# Patient Record
Sex: FEMALE | Race: WHITE | Employment: FULL TIME
[De-identification: names, ages, dates, MRNs, and addresses within clinical notes are randomized per-mention and may not be internally consistent; named-entity substitution may affect disease eponyms.]

---

## 2017-01-01 ENCOUNTER — HOME CARE VISIT (OUTPATIENT)
Dept: SCHEDULING | Facility: HOME HEALTH | Age: 42
End: 2017-01-01
Payer: COMMERCIAL

## 2017-01-01 ENCOUNTER — TELEPHONE (OUTPATIENT)
Dept: GYNECOLOGY | Age: 42
End: 2017-01-01

## 2017-01-01 ENCOUNTER — HOSPITAL ENCOUNTER (INPATIENT)
Age: 42
LOS: 1 days | End: 2017-04-28
Attending: INTERNAL MEDICINE | Admitting: INTERNAL MEDICINE

## 2017-01-01 ENCOUNTER — DOCUMENTATION ONLY (OUTPATIENT)
Dept: GYNECOLOGY | Age: 42
End: 2017-01-01

## 2017-01-01 ENCOUNTER — HOME CARE VISIT (OUTPATIENT)
Dept: HOSPICE | Facility: HOSPICE | Age: 42
End: 2017-01-01
Payer: COMMERCIAL

## 2017-01-01 ENCOUNTER — HOSPITAL ENCOUNTER (OUTPATIENT)
Dept: INFUSION THERAPY | Age: 42
End: 2017-01-01

## 2017-01-01 ENCOUNTER — HOSPITAL ENCOUNTER (OUTPATIENT)
Dept: GENERAL RADIOLOGY | Age: 42
Discharge: HOME | End: 2017-02-11
Payer: COMMERCIAL

## 2017-01-01 ENCOUNTER — HOSPITAL ENCOUNTER (OUTPATIENT)
Dept: INFUSION THERAPY | Age: 42
End: 2017-01-01
Payer: COMMERCIAL

## 2017-01-01 ENCOUNTER — OFFICE VISIT (OUTPATIENT)
Dept: GYNECOLOGY | Age: 42
End: 2017-01-01

## 2017-01-01 ENCOUNTER — HOSPITAL ENCOUNTER (OUTPATIENT)
Dept: INFUSION THERAPY | Age: 42
Discharge: HOME | End: 2017-02-09
Payer: COMMERCIAL

## 2017-01-01 ENCOUNTER — APPOINTMENT (OUTPATIENT)
Dept: ULTRASOUND IMAGING | Age: 42
DRG: 683 | End: 2017-01-01
Attending: OBSTETRICS & GYNECOLOGY
Payer: COMMERCIAL

## 2017-01-01 ENCOUNTER — HOSPITAL ENCOUNTER (OUTPATIENT)
Dept: INFUSION THERAPY | Age: 42
Discharge: HOME | End: 2017-01-26
Payer: COMMERCIAL

## 2017-01-01 ENCOUNTER — ANESTHESIA (OUTPATIENT)
Dept: SURGERY | Age: 42
End: 2017-01-01
Payer: COMMERCIAL

## 2017-01-01 ENCOUNTER — HOSPICE ADMISSION (OUTPATIENT)
Dept: HOSPICE | Facility: HOSPICE | Age: 42
End: 2017-01-01
Payer: COMMERCIAL

## 2017-01-01 ENCOUNTER — DOCUMENTATION ONLY (OUTPATIENT)
Dept: ONCOLOGY | Age: 42
End: 2017-01-01

## 2017-01-01 ENCOUNTER — HOSPITAL ENCOUNTER (INPATIENT)
Age: 42
LOS: 4 days | Discharge: HOME HEALTH | DRG: 683 | End: 2017-03-01
Attending: OBSTETRICS & GYNECOLOGY | Admitting: OBSTETRICS & GYNECOLOGY
Payer: COMMERCIAL

## 2017-01-01 ENCOUNTER — APPOINTMENT (OUTPATIENT)
Dept: INTERVENTIONAL RADIOLOGY/VASCULAR | Age: 42
DRG: 683 | End: 2017-01-01
Attending: UROLOGY
Payer: COMMERCIAL

## 2017-01-01 ENCOUNTER — TELEPHONE (OUTPATIENT)
Dept: ONCOLOGY | Age: 42
End: 2017-01-01

## 2017-01-01 ENCOUNTER — HOSPITAL ENCOUNTER (OUTPATIENT)
Dept: ULTRASOUND IMAGING | Age: 42
Discharge: HOME | End: 2017-01-09
Attending: OBSTETRICS & GYNECOLOGY
Payer: COMMERCIAL

## 2017-01-01 ENCOUNTER — HOSPITAL ENCOUNTER (OUTPATIENT)
Dept: INFUSION THERAPY | Age: 42
Discharge: HOME | End: 2017-02-02
Payer: COMMERCIAL

## 2017-01-01 ENCOUNTER — HOSPITAL ENCOUNTER (OUTPATIENT)
Dept: INFUSION THERAPY | Age: 42
Discharge: HOME | End: 2017-02-24
Payer: COMMERCIAL

## 2017-01-01 ENCOUNTER — APPOINTMENT (OUTPATIENT)
Dept: INTERVENTIONAL RADIOLOGY/VASCULAR | Age: 42
DRG: 683 | End: 2017-01-01
Attending: INTERNAL MEDICINE
Payer: COMMERCIAL

## 2017-01-01 ENCOUNTER — APPOINTMENT (OUTPATIENT)
Dept: INFUSION THERAPY | Age: 42
End: 2017-01-01
Payer: COMMERCIAL

## 2017-01-01 ENCOUNTER — HOSPITAL ENCOUNTER (OUTPATIENT)
Dept: INFUSION THERAPY | Age: 42
Discharge: HOME | End: 2017-01-25
Payer: COMMERCIAL

## 2017-01-01 ENCOUNTER — NURSE NAVIGATOR (OUTPATIENT)
Dept: ONCOLOGY | Age: 42
End: 2017-01-01

## 2017-01-01 ENCOUNTER — HOSPICE CERTIFICATION ENCOUNTER (OUTPATIENT)
Dept: GERIATRIC MEDICINE | Age: 42
End: 2017-01-01

## 2017-01-01 ENCOUNTER — SURGERY (OUTPATIENT)
Age: 42
End: 2017-01-01

## 2017-01-01 ENCOUNTER — HOSPITAL ENCOUNTER (OUTPATIENT)
Dept: INFUSION THERAPY | Age: 42
Discharge: HOME | End: 2017-01-19
Payer: COMMERCIAL

## 2017-01-01 ENCOUNTER — HOSPITAL ENCOUNTER (OUTPATIENT)
Dept: INFUSION THERAPY | Age: 42
Discharge: HOME | End: 2017-02-08
Payer: COMMERCIAL

## 2017-01-01 ENCOUNTER — APPOINTMENT (OUTPATIENT)
Dept: INTERVENTIONAL RADIOLOGY/VASCULAR | Age: 42
End: 2017-01-01
Attending: EMERGENCY MEDICINE
Payer: COMMERCIAL

## 2017-01-01 ENCOUNTER — HOSPITAL ENCOUNTER (OUTPATIENT)
Dept: INFUSION THERAPY | Age: 42
Discharge: HOME | End: 2017-01-05
Payer: COMMERCIAL

## 2017-01-01 ENCOUNTER — HOSPITAL ENCOUNTER (OUTPATIENT)
Dept: INFUSION THERAPY | Age: 42
Discharge: HOME | End: 2017-02-07
Payer: COMMERCIAL

## 2017-01-01 ENCOUNTER — HOSPITAL ENCOUNTER (EMERGENCY)
Age: 42
Discharge: HOME | End: 2017-03-10
Attending: EMERGENCY MEDICINE
Payer: COMMERCIAL

## 2017-01-01 ENCOUNTER — HOSPITAL ENCOUNTER (OUTPATIENT)
Dept: INFUSION THERAPY | Age: 42
Discharge: HOME | End: 2017-01-12
Payer: COMMERCIAL

## 2017-01-01 ENCOUNTER — HOSPITAL ENCOUNTER (OUTPATIENT)
Age: 42
Setting detail: OUTPATIENT SURGERY
Discharge: HOME | End: 2017-01-16
Attending: OBSTETRICS & GYNECOLOGY | Admitting: OBSTETRICS & GYNECOLOGY
Payer: COMMERCIAL

## 2017-01-01 ENCOUNTER — HOSPITAL ENCOUNTER (OUTPATIENT)
Dept: ULTRASOUND IMAGING | Age: 42
Discharge: HOME | DRG: 683 | End: 2017-02-27
Attending: NURSE PRACTITIONER
Payer: COMMERCIAL

## 2017-01-01 ENCOUNTER — APPOINTMENT (OUTPATIENT)
Dept: GENERAL RADIOLOGY | Age: 42
DRG: 683 | End: 2017-01-01
Attending: RADIOLOGY
Payer: COMMERCIAL

## 2017-01-01 ENCOUNTER — ANESTHESIA EVENT (OUTPATIENT)
Dept: SURGERY | Age: 42
End: 2017-01-01
Payer: COMMERCIAL

## 2017-01-01 ENCOUNTER — APPOINTMENT (OUTPATIENT)
Dept: CT IMAGING | Age: 42
DRG: 683 | End: 2017-01-01
Attending: INTERNAL MEDICINE
Payer: COMMERCIAL

## 2017-01-01 ENCOUNTER — APPOINTMENT (OUTPATIENT)
Dept: GENERAL RADIOLOGY | Age: 42
DRG: 683 | End: 2017-01-01
Attending: OBSTETRICS & GYNECOLOGY
Payer: COMMERCIAL

## 2017-01-01 VITALS
SYSTOLIC BLOOD PRESSURE: 107 MMHG | OXYGEN SATURATION: 99 % | DIASTOLIC BLOOD PRESSURE: 74 MMHG | RESPIRATION RATE: 18 BRPM | HEART RATE: 100 BPM

## 2017-01-01 VITALS
SYSTOLIC BLOOD PRESSURE: 138 MMHG | DIASTOLIC BLOOD PRESSURE: 79 MMHG | TEMPERATURE: 98.1 F | RESPIRATION RATE: 18 BRPM | HEART RATE: 66 BPM

## 2017-01-01 VITALS — SYSTOLIC BLOOD PRESSURE: 112 MMHG | HEART RATE: 114 BPM | DIASTOLIC BLOOD PRESSURE: 81 MMHG

## 2017-01-01 VITALS
OXYGEN SATURATION: 98 % | DIASTOLIC BLOOD PRESSURE: 70 MMHG | SYSTOLIC BLOOD PRESSURE: 130 MMHG | RESPIRATION RATE: 18 BRPM | HEART RATE: 98 BPM

## 2017-01-01 VITALS
RESPIRATION RATE: 16 BRPM | TEMPERATURE: 98 F | OXYGEN SATURATION: 100 % | WEIGHT: 259 LBS | DIASTOLIC BLOOD PRESSURE: 78 MMHG | HEIGHT: 66 IN | BODY MASS INDEX: 41.62 KG/M2 | HEART RATE: 70 BPM | SYSTOLIC BLOOD PRESSURE: 120 MMHG

## 2017-01-01 VITALS
TEMPERATURE: 101.4 F | HEART RATE: 79 BPM | SYSTOLIC BLOOD PRESSURE: 133 MMHG | RESPIRATION RATE: 16 BRPM | DIASTOLIC BLOOD PRESSURE: 82 MMHG

## 2017-01-01 VITALS
RESPIRATION RATE: 42 BRPM | OXYGEN SATURATION: 96 % | SYSTOLIC BLOOD PRESSURE: 102 MMHG | RESPIRATION RATE: 16 BRPM | TEMPERATURE: 97.4 F | DIASTOLIC BLOOD PRESSURE: 60 MMHG | HEART RATE: 120 BPM | HEART RATE: 50 BPM

## 2017-01-01 VITALS
HEART RATE: 70 BPM | SYSTOLIC BLOOD PRESSURE: 139 MMHG | WEIGHT: 264 LBS | HEIGHT: 66 IN | BODY MASS INDEX: 42.43 KG/M2 | DIASTOLIC BLOOD PRESSURE: 78 MMHG

## 2017-01-01 VITALS — WEIGHT: 288 LBS | BODY MASS INDEX: 46.28 KG/M2 | HEIGHT: 66 IN

## 2017-01-01 VITALS
HEART RATE: 100 BPM | SYSTOLIC BLOOD PRESSURE: 130 MMHG | OXYGEN SATURATION: 99 % | RESPIRATION RATE: 16 BRPM | DIASTOLIC BLOOD PRESSURE: 70 MMHG

## 2017-01-01 VITALS
RESPIRATION RATE: 18 BRPM | HEART RATE: 79 BPM | TEMPERATURE: 100.8 F | OXYGEN SATURATION: 97 % | SYSTOLIC BLOOD PRESSURE: 147 MMHG | DIASTOLIC BLOOD PRESSURE: 82 MMHG

## 2017-01-01 VITALS
WEIGHT: 293 LBS | HEIGHT: 67 IN | OXYGEN SATURATION: 97 % | HEART RATE: 80 BPM | SYSTOLIC BLOOD PRESSURE: 138 MMHG | BODY MASS INDEX: 45.99 KG/M2 | DIASTOLIC BLOOD PRESSURE: 78 MMHG | TEMPERATURE: 100.1 F | RESPIRATION RATE: 16 BRPM

## 2017-01-01 VITALS
TEMPERATURE: 96.3 F | HEART RATE: 66 BPM | DIASTOLIC BLOOD PRESSURE: 90 MMHG | SYSTOLIC BLOOD PRESSURE: 143 MMHG | RESPIRATION RATE: 18 BRPM

## 2017-01-01 VITALS — RESPIRATION RATE: 16 BRPM

## 2017-01-01 VITALS
OXYGEN SATURATION: 94 % | RESPIRATION RATE: 17 BRPM | SYSTOLIC BLOOD PRESSURE: 153 MMHG | HEART RATE: 75 BPM | WEIGHT: 266.54 LBS | BODY MASS INDEX: 41.83 KG/M2 | TEMPERATURE: 98.6 F | HEIGHT: 67 IN | DIASTOLIC BLOOD PRESSURE: 91 MMHG

## 2017-01-01 VITALS
HEIGHT: 67 IN | BODY MASS INDEX: 45.99 KG/M2 | RESPIRATION RATE: 18 BRPM | HEART RATE: 77 BPM | SYSTOLIC BLOOD PRESSURE: 133 MMHG | DIASTOLIC BLOOD PRESSURE: 83 MMHG | OXYGEN SATURATION: 95 % | WEIGHT: 293 LBS | TEMPERATURE: 98.3 F

## 2017-01-01 VITALS
RESPIRATION RATE: 16 BRPM | OXYGEN SATURATION: 99 % | SYSTOLIC BLOOD PRESSURE: 132 MMHG | DIASTOLIC BLOOD PRESSURE: 80 MMHG | HEART RATE: 89 BPM

## 2017-01-01 VITALS — HEART RATE: 78 BPM | DIASTOLIC BLOOD PRESSURE: 86 MMHG | SYSTOLIC BLOOD PRESSURE: 138 MMHG | RESPIRATION RATE: 18 BRPM

## 2017-01-01 VITALS — HEART RATE: 88 BPM | RESPIRATION RATE: 18 BRPM

## 2017-01-01 VITALS
OXYGEN SATURATION: 95 % | WEIGHT: 293 LBS | TEMPERATURE: 100.1 F | BODY MASS INDEX: 45.99 KG/M2 | HEIGHT: 67 IN | DIASTOLIC BLOOD PRESSURE: 77 MMHG | SYSTOLIC BLOOD PRESSURE: 132 MMHG | RESPIRATION RATE: 18 BRPM | HEART RATE: 91 BPM

## 2017-01-01 VITALS
DIASTOLIC BLOOD PRESSURE: 62 MMHG | SYSTOLIC BLOOD PRESSURE: 98 MMHG | HEART RATE: 96 BPM | WEIGHT: 220 LBS | RESPIRATION RATE: 26 BRPM | HEIGHT: 66 IN | BODY MASS INDEX: 35.36 KG/M2

## 2017-01-01 VITALS
TEMPERATURE: 97.2 F | OXYGEN SATURATION: 97 % | BODY MASS INDEX: 45.99 KG/M2 | RESPIRATION RATE: 18 BRPM | HEIGHT: 67 IN | SYSTOLIC BLOOD PRESSURE: 163 MMHG | WEIGHT: 293 LBS | DIASTOLIC BLOOD PRESSURE: 80 MMHG | HEART RATE: 56 BPM

## 2017-01-01 VITALS
TEMPERATURE: 98.4 F | RESPIRATION RATE: 16 BRPM | SYSTOLIC BLOOD PRESSURE: 161 MMHG | DIASTOLIC BLOOD PRESSURE: 80 MMHG | HEART RATE: 68 BPM

## 2017-01-01 VITALS
RESPIRATION RATE: 18 BRPM | HEART RATE: 111 BPM | SYSTOLIC BLOOD PRESSURE: 100 MMHG | DIASTOLIC BLOOD PRESSURE: 60 MMHG | OXYGEN SATURATION: 99 %

## 2017-01-01 VITALS
DIASTOLIC BLOOD PRESSURE: 76 MMHG | SYSTOLIC BLOOD PRESSURE: 140 MMHG | RESPIRATION RATE: 16 BRPM | TEMPERATURE: 99.6 F | HEART RATE: 80 BPM

## 2017-01-01 DIAGNOSIS — N17.9 ACUTE RENAL FAILURE, UNSPECIFIED ACUTE RENAL FAILURE TYPE (HCC): Primary | ICD-10-CM

## 2017-01-01 DIAGNOSIS — R63.4 WEIGHT LOSS, ABNORMAL: ICD-10-CM

## 2017-01-01 DIAGNOSIS — N17.9 ACUTE RENAL FAILURE, UNSPECIFIED ACUTE RENAL FAILURE TYPE (HCC): ICD-10-CM

## 2017-01-01 DIAGNOSIS — E66.01 OBESITY, MORBID, BMI 50 OR HIGHER (HCC): ICD-10-CM

## 2017-01-01 DIAGNOSIS — G89.3 CANCER ASSOCIATED PAIN: ICD-10-CM

## 2017-01-01 DIAGNOSIS — T30.0 RADIATION BURN: ICD-10-CM

## 2017-01-01 DIAGNOSIS — R30.0 DYSURIA: ICD-10-CM

## 2017-01-01 DIAGNOSIS — M54.5 ACUTE LOW BACK PAIN, UNSPECIFIED BACK PAIN LATERALITY, WITH SCIATICA PRESENCE UNSPECIFIED: Primary | ICD-10-CM

## 2017-01-01 DIAGNOSIS — C53.0 MALIGNANT NEOPLASM OF ENDOCERVIX (HCC): Primary | ICD-10-CM

## 2017-01-01 DIAGNOSIS — C53.0 MALIGNANT NEOPLASM OF ENDOCERVIX (HCC): ICD-10-CM

## 2017-01-01 DIAGNOSIS — R63.0 ANOREXIA: Primary | ICD-10-CM

## 2017-01-01 DIAGNOSIS — M25.551 HIP PAIN, ACUTE, RIGHT: ICD-10-CM

## 2017-01-01 DIAGNOSIS — N13.2 HYDRONEPHROSIS WITH RENAL AND URETERAL CALCULUS OBSTRUCTION: ICD-10-CM

## 2017-01-01 DIAGNOSIS — M54.41 CHRONIC RIGHT-SIDED LOW BACK PAIN WITH RIGHT-SIDED SCIATICA: Primary | ICD-10-CM

## 2017-01-01 DIAGNOSIS — M54.41 ACUTE RIGHT-SIDED LOW BACK PAIN WITH RIGHT-SIDED SCIATICA: ICD-10-CM

## 2017-01-01 DIAGNOSIS — N39.0 URINARY TRACT INFECTION WITHOUT HEMATURIA, SITE UNSPECIFIED: Primary | ICD-10-CM

## 2017-01-01 DIAGNOSIS — D64.9 ANEMIA, UNSPECIFIED TYPE: ICD-10-CM

## 2017-01-01 DIAGNOSIS — D64.9 ANEMIA, UNSPECIFIED TYPE: Primary | ICD-10-CM

## 2017-01-01 DIAGNOSIS — E83.42 HYPOMAGNESEMIA: Primary | ICD-10-CM

## 2017-01-01 DIAGNOSIS — N19 RENAL FAILURE: ICD-10-CM

## 2017-01-01 DIAGNOSIS — Z92.3 S/P RADIATION THERAPY < 4 WEEKS AGO: ICD-10-CM

## 2017-01-01 DIAGNOSIS — T83.022A NEPHROSTOMY TUBE DISPLACED (HCC): Primary | ICD-10-CM

## 2017-01-01 DIAGNOSIS — G89.29 CHRONIC RIGHT-SIDED LOW BACK PAIN WITH RIGHT-SIDED SCIATICA: Primary | ICD-10-CM

## 2017-01-01 DIAGNOSIS — M54.41 ACUTE RIGHT-SIDED LOW BACK PAIN WITH RIGHT-SIDED SCIATICA: Primary | ICD-10-CM

## 2017-01-01 DIAGNOSIS — T83.022A DISPLACEMENT OF NEPHROSTOMY TUBE (HCC): ICD-10-CM

## 2017-01-01 DIAGNOSIS — E86.0 DEHYDRATION: ICD-10-CM

## 2017-01-01 LAB
ABO + RH BLD: NORMAL
ALBUMIN SERPL BCP-MCNC: 2 G/DL (ref 3.5–5)
ALBUMIN SERPL BCP-MCNC: 2 G/DL (ref 3.5–5)
ALBUMIN SERPL BCP-MCNC: 2.3 G/DL (ref 3.5–5)
ALBUMIN SERPL BCP-MCNC: 2.4 G/DL (ref 3.5–5)
ALBUMIN SERPL BCP-MCNC: 2.5 G/DL (ref 3.5–5)
ALBUMIN SERPL BCP-MCNC: 2.6 G/DL (ref 3.5–5)
ALBUMIN SERPL BCP-MCNC: 2.7 G/DL (ref 3.5–5)
ALBUMIN SERPL BCP-MCNC: 2.9 G/DL (ref 3.5–5)
ALBUMIN SERPL-MCNC: 2.8 G/DL (ref 3.5–5.5)
ALBUMIN SERPL-MCNC: 3.2 G/DL (ref 3.5–5.5)
ALBUMIN SERPL-MCNC: 3.4 G/DL (ref 3.5–5.5)
ALBUMIN/GLOB SERPL: 0.4 {RATIO} (ref 1.1–2.2)
ALBUMIN/GLOB SERPL: 0.5 {RATIO} (ref 1.1–2.2)
ALBUMIN/GLOB SERPL: 0.6 {RATIO} (ref 1.1–2.2)
ALBUMIN/GLOB SERPL: 0.7 {RATIO} (ref 1.1–2.2)
ALBUMIN/GLOB SERPL: 0.8 {RATIO} (ref 1.1–2.2)
ALBUMIN/GLOB SERPL: 0.9 {RATIO} (ref 1.1–2.2)
ALBUMIN/GLOB SERPL: 1 {RATIO} (ref 1.2–2.2)
ALBUMIN/GLOB SERPL: 1.1 {RATIO} (ref 1.1–2.5)
ALBUMIN/GLOB SERPL: 1.1 {RATIO} (ref 1.1–2.5)
ALP SERPL-CCNC: 193 U/L (ref 45–117)
ALP SERPL-CCNC: 194 U/L (ref 45–117)
ALP SERPL-CCNC: 195 U/L (ref 45–117)
ALP SERPL-CCNC: 198 U/L (ref 45–117)
ALP SERPL-CCNC: 228 U/L (ref 45–117)
ALP SERPL-CCNC: 229 U/L (ref 45–117)
ALP SERPL-CCNC: 234 U/L (ref 45–117)
ALP SERPL-CCNC: 235 IU/L (ref 39–117)
ALP SERPL-CCNC: 253 U/L (ref 45–117)
ALP SERPL-CCNC: 273 IU/L (ref 39–117)
ALP SERPL-CCNC: 294 U/L (ref 45–117)
ALP SERPL-CCNC: 318 IU/L (ref 39–117)
ALT SERPL-CCNC: 10 U/L (ref 12–78)
ALT SERPL-CCNC: 10 U/L (ref 12–78)
ALT SERPL-CCNC: 11 U/L (ref 12–78)
ALT SERPL-CCNC: 11 U/L (ref 12–78)
ALT SERPL-CCNC: 12 U/L (ref 12–78)
ALT SERPL-CCNC: 13 U/L (ref 12–78)
ALT SERPL-CCNC: 14 U/L (ref 12–78)
ALT SERPL-CCNC: 15 U/L (ref 12–78)
ALT SERPL-CCNC: 21 U/L (ref 12–78)
ALT SERPL-CCNC: 22 IU/L (ref 0–32)
ALT SERPL-CCNC: 6 IU/L (ref 0–32)
ALT SERPL-CCNC: 8 IU/L (ref 0–32)
AMORPH CRY URNS QL MICRO: ABNORMAL
ANION GAP BLD CALC-SCNC: 10 MMOL/L (ref 5–15)
ANION GAP BLD CALC-SCNC: 13 MMOL/L (ref 5–15)
ANION GAP BLD CALC-SCNC: 13 MMOL/L (ref 5–15)
ANION GAP BLD CALC-SCNC: 15 MMOL/L (ref 5–15)
ANION GAP BLD CALC-SCNC: 15 MMOL/L (ref 5–15)
ANION GAP BLD CALC-SCNC: 16 MMOL/L (ref 5–15)
ANION GAP BLD CALC-SCNC: 6 MMOL/L (ref 5–15)
ANION GAP BLD CALC-SCNC: 7 MMOL/L (ref 5–15)
ANION GAP BLD CALC-SCNC: 8 MMOL/L (ref 5–15)
ANION GAP BLD CALC-SCNC: 8 MMOL/L (ref 5–15)
ANION GAP BLD CALC-SCNC: 9 MMOL/L (ref 5–15)
ANION GAP BLD CALC-SCNC: 9 MMOL/L (ref 5–15)
APPEARANCE UR: ABNORMAL
APPEARANCE UR: ABNORMAL
AST SERPL W P-5'-P-CCNC: 11 U/L (ref 15–37)
AST SERPL W P-5'-P-CCNC: 11 U/L (ref 15–37)
AST SERPL W P-5'-P-CCNC: 15 U/L (ref 15–37)
AST SERPL W P-5'-P-CCNC: 17 U/L (ref 15–37)
AST SERPL W P-5'-P-CCNC: 22 U/L (ref 15–37)
AST SERPL W P-5'-P-CCNC: 23 U/L (ref 15–37)
AST SERPL W P-5'-P-CCNC: 24 U/L (ref 15–37)
AST SERPL W P-5'-P-CCNC: 25 U/L (ref 15–37)
AST SERPL W P-5'-P-CCNC: 9 U/L (ref 15–37)
AST SERPL-CCNC: 10 IU/L (ref 0–40)
AST SERPL-CCNC: 16 IU/L (ref 0–40)
AST SERPL-CCNC: 19 IU/L (ref 0–40)
BACTERIA #/AREA URNS HPF: ABNORMAL /[HPF]
BACTERIA SPEC CULT: ABNORMAL
BACTERIA UR CULT: NORMAL
BACTERIA URNS QL MICRO: ABNORMAL /HPF
BASOPHILS # BLD AUTO: 0 K/UL (ref 0–0.1)
BASOPHILS # BLD AUTO: 0 X10E3/UL (ref 0–0.2)
BASOPHILS # BLD AUTO: 0 X10E3/UL (ref 0–0.2)
BASOPHILS # BLD: 0 % (ref 0–1)
BASOPHILS # BLD: 1 % (ref 0–1)
BASOPHILS NFR BLD AUTO: 0 %
BASOPHILS NFR BLD AUTO: 0 %
BILIRUB SERPL-MCNC: 0.3 MG/DL (ref 0.2–1)
BILIRUB SERPL-MCNC: 0.4 MG/DL (ref 0.2–1)
BILIRUB SERPL-MCNC: 0.5 MG/DL (ref 0.2–1)
BILIRUB SERPL-MCNC: 0.5 MG/DL (ref 0–1.2)
BILIRUB SERPL-MCNC: 0.6 MG/DL (ref 0.2–1)
BILIRUB SERPL-MCNC: 0.6 MG/DL (ref 0–1.2)
BILIRUB SERPL-MCNC: 0.6 MG/DL (ref 0–1.2)
BILIRUB SERPL-MCNC: 1 MG/DL (ref 0.2–1)
BILIRUB UR QL STRIP: NEGATIVE
BILIRUB UR QL: NEGATIVE
BLD PROD TYP BPU: NORMAL
BLOOD GROUP ANTIBODIES SERPL: NORMAL
BPU ID: NORMAL
BUN SERPL-MCNC: 103 MG/DL (ref 6–20)
BUN SERPL-MCNC: 103 MG/DL (ref 6–20)
BUN SERPL-MCNC: 104 MG/DL (ref 6–20)
BUN SERPL-MCNC: 113 MG/DL (ref 6–24)
BUN SERPL-MCNC: 13 MG/DL (ref 6–20)
BUN SERPL-MCNC: 14 MG/DL (ref 6–20)
BUN SERPL-MCNC: 23 MG/DL (ref 6–20)
BUN SERPL-MCNC: 28 MG/DL (ref 6–24)
BUN SERPL-MCNC: 31 MG/DL (ref 6–20)
BUN SERPL-MCNC: 35 MG/DL (ref 6–20)
BUN SERPL-MCNC: 36 MG/DL (ref 6–24)
BUN SERPL-MCNC: 42 MG/DL (ref 6–20)
BUN SERPL-MCNC: 50 MG/DL (ref 6–20)
BUN SERPL-MCNC: 51 MG/DL (ref 6–20)
BUN SERPL-MCNC: 59 MG/DL (ref 6–20)
BUN/CREAT SERPL: 10 (ref 12–20)
BUN/CREAT SERPL: 11 (ref 12–20)
BUN/CREAT SERPL: 12 (ref 12–20)
BUN/CREAT SERPL: 12 (ref 9–23)
BUN/CREAT SERPL: 13 (ref 12–20)
BUN/CREAT SERPL: 13 (ref 12–20)
BUN/CREAT SERPL: 14 (ref 12–20)
BUN/CREAT SERPL: 15 (ref 12–20)
BUN/CREAT SERPL: 15 (ref 12–20)
BUN/CREAT SERPL: 16 (ref 9–23)
BUN/CREAT SERPL: 17 (ref 9–23)
BUN/CREAT SERPL: 9 (ref 12–20)
BUN/CREAT SERPL: 9 (ref 12–20)
CALCIUM SERPL-MCNC: 7.3 MG/DL (ref 8.5–10.1)
CALCIUM SERPL-MCNC: 7.5 MG/DL (ref 8.5–10.1)
CALCIUM SERPL-MCNC: 7.8 MG/DL (ref 8.5–10.1)
CALCIUM SERPL-MCNC: 7.9 MG/DL (ref 8.5–10.1)
CALCIUM SERPL-MCNC: 8 MG/DL (ref 8.5–10.1)
CALCIUM SERPL-MCNC: 8.2 MG/DL (ref 8.5–10.1)
CALCIUM SERPL-MCNC: 8.4 MG/DL (ref 8.5–10.1)
CALCIUM SERPL-MCNC: 8.4 MG/DL (ref 8.5–10.1)
CALCIUM SERPL-MCNC: 8.4 MG/DL (ref 8.7–10.2)
CALCIUM SERPL-MCNC: 8.5 MG/DL (ref 8.5–10.1)
CALCIUM SERPL-MCNC: 8.5 MG/DL (ref 8.7–10.2)
CALCIUM SERPL-MCNC: 8.7 MG/DL (ref 8.5–10.1)
CALCIUM SERPL-MCNC: 8.7 MG/DL (ref 8.7–10.2)
CASTS URNS QL MICRO: ABNORMAL /LPF
CC UR VC: ABNORMAL
CHLORIDE SERPL-SCNC: 100 MMOL/L (ref 97–108)
CHLORIDE SERPL-SCNC: 101 MMOL/L (ref 97–108)
CHLORIDE SERPL-SCNC: 102 MMOL/L (ref 97–108)
CHLORIDE SERPL-SCNC: 103 MMOL/L (ref 97–108)
CHLORIDE SERPL-SCNC: 103 MMOL/L (ref 97–108)
CHLORIDE SERPL-SCNC: 105 MMOL/L (ref 97–108)
CHLORIDE SERPL-SCNC: 106 MMOL/L (ref 97–108)
CHLORIDE SERPL-SCNC: 110 MMOL/L (ref 97–108)
CHLORIDE SERPL-SCNC: 92 MMOL/L (ref 96–106)
CHLORIDE SERPL-SCNC: 92 MMOL/L (ref 97–108)
CHLORIDE SERPL-SCNC: 93 MMOL/L (ref 96–106)
CHLORIDE SERPL-SCNC: 94 MMOL/L (ref 96–106)
CHLORIDE SERPL-SCNC: 96 MMOL/L (ref 97–108)
CHLORIDE SERPL-SCNC: 97 MMOL/L (ref 97–108)
CHLORIDE SERPL-SCNC: 99 MMOL/L (ref 97–108)
CO2 SERPL-SCNC: 15 MMOL/L (ref 21–32)
CO2 SERPL-SCNC: 16 MMOL/L (ref 18–29)
CO2 SERPL-SCNC: 16 MMOL/L (ref 21–32)
CO2 SERPL-SCNC: 18 MMOL/L (ref 21–32)
CO2 SERPL-SCNC: 18 MMOL/L (ref 21–32)
CO2 SERPL-SCNC: 19 MMOL/L (ref 18–29)
CO2 SERPL-SCNC: 20 MMOL/L (ref 21–32)
CO2 SERPL-SCNC: 21 MMOL/L (ref 21–32)
CO2 SERPL-SCNC: 22 MMOL/L (ref 18–29)
CO2 SERPL-SCNC: 24 MMOL/L (ref 21–32)
CO2 SERPL-SCNC: 25 MMOL/L (ref 21–32)
CO2 SERPL-SCNC: 28 MMOL/L (ref 21–32)
CO2 SERPL-SCNC: 28 MMOL/L (ref 21–32)
CO2 SERPL-SCNC: 29 MMOL/L (ref 21–32)
CO2 SERPL-SCNC: 30 MMOL/L (ref 21–32)
COLLECT DURATION TIME UR: 24 HR
COLOR UR: ABNORMAL
COLOR UR: YELLOW
CREAT SERPL-MCNC: 1.19 MG/DL (ref 0.55–1.02)
CREAT SERPL-MCNC: 1.55 MG/DL (ref 0.55–1.02)
CREAT SERPL-MCNC: 1.6 MG/DL (ref 0.55–1.02)
CREAT SERPL-MCNC: 2.15 MG/DL (ref 0.57–1)
CREAT SERPL-MCNC: 2.33 MG/DL (ref 0.55–1.02)
CREAT SERPL-MCNC: 2.41 MG/DL (ref 0.57–1)
CREAT SERPL-MCNC: 3.36 MG/DL (ref 0.55–1.02)
CREAT SERPL-MCNC: 3.48 MG/DL (ref 0.55–1.02)
CREAT SERPL-MCNC: 4.26 MG/DL (ref 0.55–1.02)
CREAT SERPL-MCNC: 4.78 MG/DL (ref 0.55–1.02)
CREAT SERPL-MCNC: 4.86 MG/DL (ref 0.55–1.02)
CREAT SERPL-MCNC: 7.03 MG/DL (ref 0.55–1.02)
CREAT SERPL-MCNC: 7.25 MG/DL (ref 0.57–1)
CREAT SERPL-MCNC: 8.12 MG/DL (ref 0.55–1.02)
CREAT SERPL-MCNC: 8.59 MG/DL (ref 0.55–1.02)
CREAT UR-MCNC: 78.6 MG/DL
CROSSMATCH RESULT,%XM: NORMAL
DIFFERENTIAL METHOD BLD: ABNORMAL
EOSINOPHIL # BLD AUTO: 0.1 X10E3/UL (ref 0–0.4)
EOSINOPHIL # BLD AUTO: 0.1 X10E3/UL (ref 0–0.4)
EOSINOPHIL # BLD: 0 K/UL (ref 0–0.4)
EOSINOPHIL # BLD: 0 K/UL (ref 0–0.4)
EOSINOPHIL # BLD: 0.1 K/UL (ref 0–0.4)
EOSINOPHIL NFR BLD AUTO: 2 %
EOSINOPHIL NFR BLD AUTO: 2 %
EOSINOPHIL NFR BLD: 0 % (ref 0–7)
EOSINOPHIL NFR BLD: 1 % (ref 0–7)
EOSINOPHIL NFR BLD: 2 % (ref 0–7)
EOSINOPHIL NFR BLD: 3 % (ref 0–7)
EOSINOPHIL NFR BLD: 4 % (ref 0–7)
EPI CELLS #/AREA URNS HPF: ABNORMAL /HPF
EPITH CASTS URNS QL MICRO: ABNORMAL /LPF
ERYTHROCYTE [DISTWIDTH] IN BLOOD BY AUTOMATED COUNT: 17.6 % (ref 11.5–14.5)
ERYTHROCYTE [DISTWIDTH] IN BLOOD BY AUTOMATED COUNT: 17.8 % (ref 12.3–15.4)
ERYTHROCYTE [DISTWIDTH] IN BLOOD BY AUTOMATED COUNT: 18.2 % (ref 11.5–14.5)
ERYTHROCYTE [DISTWIDTH] IN BLOOD BY AUTOMATED COUNT: 18.7 % (ref 12.3–15.4)
ERYTHROCYTE [DISTWIDTH] IN BLOOD BY AUTOMATED COUNT: 18.9 % (ref 11.5–14.5)
ERYTHROCYTE [DISTWIDTH] IN BLOOD BY AUTOMATED COUNT: 19.3 % (ref 11.5–14.5)
ERYTHROCYTE [DISTWIDTH] IN BLOOD BY AUTOMATED COUNT: 19.5 % (ref 11.5–14.5)
ERYTHROCYTE [DISTWIDTH] IN BLOOD BY AUTOMATED COUNT: 20.6 % (ref 11.5–14.5)
ERYTHROCYTE [DISTWIDTH] IN BLOOD BY AUTOMATED COUNT: 20.8 % (ref 12.3–15.4)
ERYTHROCYTE [DISTWIDTH] IN BLOOD BY AUTOMATED COUNT: 21.5 % (ref 11.5–14.5)
ERYTHROCYTE [DISTWIDTH] IN BLOOD BY AUTOMATED COUNT: 22.2 % (ref 11.5–14.5)
ERYTHROCYTE [DISTWIDTH] IN BLOOD BY AUTOMATED COUNT: 23.6 % (ref 11.5–14.5)
GLOBULIN SER CALC-MCNC: 2.8 G/DL (ref 1.5–4.5)
GLOBULIN SER CALC-MCNC: 2.9 G/DL (ref 1.5–4.5)
GLOBULIN SER CALC-MCNC: 3.1 G/DL (ref 1.5–4.5)
GLOBULIN SER CALC-MCNC: 3.4 G/DL (ref 2–4)
GLOBULIN SER CALC-MCNC: 3.6 G/DL (ref 2–4)
GLOBULIN SER CALC-MCNC: 3.9 G/DL (ref 2–4)
GLOBULIN SER CALC-MCNC: 4 G/DL (ref 2–4)
GLOBULIN SER CALC-MCNC: 4 G/DL (ref 2–4)
GLOBULIN SER CALC-MCNC: 4.1 G/DL (ref 2–4)
GLOBULIN SER CALC-MCNC: 4.5 G/DL (ref 2–4)
GLUCOSE SERPL-MCNC: 102 MG/DL (ref 65–100)
GLUCOSE SERPL-MCNC: 103 MG/DL (ref 65–100)
GLUCOSE SERPL-MCNC: 106 MG/DL (ref 65–100)
GLUCOSE SERPL-MCNC: 132 MG/DL (ref 65–100)
GLUCOSE SERPL-MCNC: 78 MG/DL (ref 65–100)
GLUCOSE SERPL-MCNC: 81 MG/DL (ref 65–100)
GLUCOSE SERPL-MCNC: 83 MG/DL (ref 65–100)
GLUCOSE SERPL-MCNC: 86 MG/DL (ref 65–100)
GLUCOSE SERPL-MCNC: 87 MG/DL (ref 65–100)
GLUCOSE SERPL-MCNC: 92 MG/DL (ref 65–100)
GLUCOSE SERPL-MCNC: 93 MG/DL (ref 65–100)
GLUCOSE SERPL-MCNC: 94 MG/DL (ref 65–99)
GLUCOSE SERPL-MCNC: 95 MG/DL (ref 65–100)
GLUCOSE SERPL-MCNC: 98 MG/DL (ref 65–99)
GLUCOSE SERPL-MCNC: 98 MG/DL (ref 65–99)
GLUCOSE UR QL: ABNORMAL
GLUCOSE UR STRIP.AUTO-MCNC: NEGATIVE MG/DL
HAV IGM SERPL QL IA: NONREACTIVE
HBV CORE IGM SER QL: NONREACTIVE
HBV SURFACE AG SER QL: 0.11 INDEX
HBV SURFACE AG SER QL: NEGATIVE
HCG UR QL: NEGATIVE
HCT VFR BLD AUTO: 24.7 % (ref 35–47)
HCT VFR BLD AUTO: 24.7 % (ref 35–47)
HCT VFR BLD AUTO: 24.9 % (ref 35–47)
HCT VFR BLD AUTO: 26.1 % (ref 35–47)
HCT VFR BLD AUTO: 26.9 % (ref 35–47)
HCT VFR BLD AUTO: 28.3 % (ref 35–47)
HCT VFR BLD AUTO: 28.5 % (ref 34–46.6)
HCT VFR BLD AUTO: 28.5 % (ref 35–47)
HCT VFR BLD AUTO: 29.1 % (ref 34–46.6)
HCT VFR BLD AUTO: 29.1 % (ref 35–47)
HCT VFR BLD AUTO: 32 % (ref 34–46.6)
HCT VFR BLD AUTO: 33.4 % (ref 35–47)
HCV AB SERPL QL IA: NONREACTIVE
HCV COMMENT,HCGAC: NORMAL
HGB BLD-MCNC: 10.3 G/DL (ref 11.1–15.9)
HGB BLD-MCNC: 10.4 G/DL (ref 11.5–16)
HGB BLD-MCNC: 7.6 G/DL (ref 11.5–16)
HGB BLD-MCNC: 7.8 G/DL (ref 11.5–16)
HGB BLD-MCNC: 7.8 G/DL (ref 11.5–16)
HGB BLD-MCNC: 8 G/DL (ref 11.5–16)
HGB BLD-MCNC: 8.2 G/DL (ref 11.5–16)
HGB BLD-MCNC: 9.1 G/DL (ref 11.5–16)
HGB BLD-MCNC: 9.1 G/DL (ref 11.5–16)
HGB BLD-MCNC: 9.3 G/DL (ref 11.5–16)
HGB BLD-MCNC: 9.5 G/DL (ref 11.1–15.9)
HGB BLD-MCNC: 9.8 G/DL (ref 11.1–15.9)
HGB UR QL STRIP: ABNORMAL
HGB UR QL STRIP: NEGATIVE
HYALINE CASTS URNS QL MICRO: ABNORMAL /LPF (ref 0–5)
IMM GRANULOCYTES # BLD: 0 X10E3/UL (ref 0–0.1)
IMM GRANULOCYTES NFR BLD: 0 %
KETONES UR QL STRIP.AUTO: ABNORMAL MG/DL
KETONES UR QL STRIP: NEGATIVE
LEUKOCYTE ESTERASE UR QL STRIP.AUTO: ABNORMAL
LEUKOCYTE ESTERASE UR QL STRIP: ABNORMAL
LYMPHOCYTES # BLD AUTO: 1.1 X10E3/UL (ref 0.7–3.1)
LYMPHOCYTES # BLD AUTO: 1.1 X10E3/UL (ref 0.7–3.1)
LYMPHOCYTES # BLD AUTO: 11 % (ref 12–49)
LYMPHOCYTES # BLD AUTO: 12 % (ref 12–49)
LYMPHOCYTES # BLD AUTO: 12 % (ref 12–49)
LYMPHOCYTES # BLD AUTO: 15 % (ref 12–49)
LYMPHOCYTES # BLD AUTO: 16 % (ref 12–49)
LYMPHOCYTES # BLD AUTO: 17 % (ref 12–49)
LYMPHOCYTES # BLD AUTO: 19 % (ref 12–49)
LYMPHOCYTES # BLD AUTO: 6 % (ref 12–49)
LYMPHOCYTES # BLD AUTO: 9 % (ref 12–49)
LYMPHOCYTES # BLD: 0.5 K/UL (ref 0.8–3.5)
LYMPHOCYTES # BLD: 0.6 K/UL (ref 0.8–3.5)
LYMPHOCYTES # BLD: 0.7 K/UL (ref 0.8–3.5)
LYMPHOCYTES # BLD: 0.8 K/UL (ref 0.8–3.5)
LYMPHOCYTES # BLD: 0.8 K/UL (ref 0.8–3.5)
LYMPHOCYTES # BLD: 0.9 K/UL (ref 0.8–3.5)
LYMPHOCYTES # BLD: 1 K/UL (ref 0.8–3.5)
LYMPHOCYTES NFR BLD AUTO: 16 %
LYMPHOCYTES NFR BLD AUTO: 21 %
MAGNESIUM SERPL-MCNC: 1.1 MG/DL (ref 1.6–2.4)
MAGNESIUM SERPL-MCNC: 1.3 MG/DL (ref 1.6–2.4)
MAGNESIUM SERPL-MCNC: 1.4 MG/DL (ref 1.6–2.4)
MAGNESIUM SERPL-MCNC: 1.5 MG/DL (ref 1.6–2.3)
MAGNESIUM SERPL-MCNC: 1.5 MG/DL (ref 1.6–2.3)
MAGNESIUM SERPL-MCNC: 1.5 MG/DL (ref 1.6–2.4)
MAGNESIUM SERPL-MCNC: 1.6 MG/DL (ref 1.6–2.4)
MAGNESIUM SERPL-MCNC: 1.6 MG/DL (ref 1.6–2.4)
MAGNESIUM SERPL-MCNC: 1.9 MG/DL (ref 1.6–2.4)
MAGNESIUM SERPL-MCNC: 2 MG/DL (ref 1.6–2.3)
MCH RBC QN AUTO: 24.3 PG (ref 26–34)
MCH RBC QN AUTO: 24.5 PG (ref 26–34)
MCH RBC QN AUTO: 24.6 PG (ref 26–34)
MCH RBC QN AUTO: 24.6 PG (ref 26–34)
MCH RBC QN AUTO: 24.8 PG (ref 26–34)
MCH RBC QN AUTO: 25.3 PG (ref 26–34)
MCH RBC QN AUTO: 25.6 PG (ref 26–34)
MCH RBC QN AUTO: 26.3 PG (ref 26.6–33)
MCH RBC QN AUTO: 26.3 PG (ref 26–34)
MCH RBC QN AUTO: 26.4 PG (ref 26.6–33)
MCH RBC QN AUTO: 26.5 PG (ref 26–34)
MCH RBC QN AUTO: 27.1 PG (ref 26.6–33)
MCHC RBC AUTO-ENTMCNC: 30.5 G/DL (ref 30–36.5)
MCHC RBC AUTO-ENTMCNC: 30.7 G/DL (ref 30–36.5)
MCHC RBC AUTO-ENTMCNC: 30.8 G/DL (ref 30–36.5)
MCHC RBC AUTO-ENTMCNC: 31.1 G/DL (ref 30–36.5)
MCHC RBC AUTO-ENTMCNC: 31.3 G/DL (ref 30–36.5)
MCHC RBC AUTO-ENTMCNC: 31.6 G/DL (ref 30–36.5)
MCHC RBC AUTO-ENTMCNC: 31.9 G/DL (ref 30–36.5)
MCHC RBC AUTO-ENTMCNC: 32 G/DL (ref 30–36.5)
MCHC RBC AUTO-ENTMCNC: 32.2 G/DL (ref 30–36.5)
MCHC RBC AUTO-ENTMCNC: 32.2 G/DL (ref 31.5–35.7)
MCHC RBC AUTO-ENTMCNC: 32.6 G/DL (ref 31.5–35.7)
MCHC RBC AUTO-ENTMCNC: 34.4 G/DL (ref 31.5–35.7)
MCV RBC AUTO: 76.2 FL (ref 80–99)
MCV RBC AUTO: 77 FL (ref 79–97)
MCV RBC AUTO: 78.7 FL (ref 80–99)
MCV RBC AUTO: 79.7 FL (ref 80–99)
MCV RBC AUTO: 79.9 FL (ref 80–99)
MCV RBC AUTO: 80.3 FL (ref 80–99)
MCV RBC AUTO: 80.3 FL (ref 80–99)
MCV RBC AUTO: 80.8 FL (ref 80–99)
MCV RBC AUTO: 82 FL (ref 79–97)
MCV RBC AUTO: 82.2 FL (ref 80–99)
MCV RBC AUTO: 83 FL (ref 79–97)
MCV RBC AUTO: 85.2 FL (ref 80–99)
MICRO URNS: ABNORMAL
MONOCYTES # BLD AUTO: 0.7 X10E3/UL (ref 0.1–0.9)
MONOCYTES # BLD AUTO: 0.7 X10E3/UL (ref 0.1–0.9)
MONOCYTES # BLD: 0.3 K/UL (ref 0–1)
MONOCYTES # BLD: 0.3 K/UL (ref 0–1)
MONOCYTES # BLD: 0.5 K/UL (ref 0–1)
MONOCYTES # BLD: 0.6 K/UL (ref 0–1)
MONOCYTES # BLD: 0.6 K/UL (ref 0–1)
MONOCYTES # BLD: 0.7 K/UL (ref 0–1)
MONOCYTES # BLD: 0.9 K/UL (ref 0–1)
MONOCYTES NFR BLD AUTO: 10 %
MONOCYTES NFR BLD AUTO: 10 % (ref 5–13)
MONOCYTES NFR BLD AUTO: 11 % (ref 5–13)
MONOCYTES NFR BLD AUTO: 14 %
MONOCYTES NFR BLD AUTO: 18 % (ref 5–13)
MONOCYTES NFR BLD AUTO: 2 % (ref 5–13)
MONOCYTES NFR BLD AUTO: 8 % (ref 5–13)
MUCOUS THREADS URNS QL MICRO: PRESENT
NEUTROPHILS # BLD AUTO: 3.4 X10E3/UL (ref 1.4–7)
NEUTROPHILS # BLD AUTO: 4.8 X10E3/UL (ref 1.4–7)
NEUTROPHILS NFR BLD AUTO: 63 %
NEUTROPHILS NFR BLD AUTO: 72 %
NEUTS BAND NFR BLD MANUAL: 10 % (ref 0–6)
NEUTS BAND NFR BLD MANUAL: 2 %
NEUTS SEG # BLD: 12.3 K/UL (ref 1.8–8)
NEUTS SEG # BLD: 2.1 K/UL (ref 1.8–8)
NEUTS SEG # BLD: 3.2 K/UL (ref 1.8–8)
NEUTS SEG # BLD: 3.4 K/UL (ref 1.8–8)
NEUTS SEG # BLD: 3.7 K/UL (ref 1.8–8)
NEUTS SEG # BLD: 3.9 K/UL (ref 1.8–8)
NEUTS SEG # BLD: 4 K/UL (ref 1.8–8)
NEUTS SEG # BLD: 4.5 K/UL (ref 1.8–8)
NEUTS SEG # BLD: 4.8 K/UL (ref 1.8–8)
NEUTS SEG NFR BLD AUTO: 62 % (ref 32–75)
NEUTS SEG NFR BLD AUTO: 65 % (ref 32–75)
NEUTS SEG NFR BLD AUTO: 72 % (ref 32–75)
NEUTS SEG NFR BLD AUTO: 76 % (ref 32–75)
NEUTS SEG NFR BLD AUTO: 77 % (ref 32–75)
NEUTS SEG NFR BLD AUTO: 79 % (ref 32–75)
NEUTS SEG NFR BLD AUTO: 82 % (ref 32–75)
NITRITE UR QL STRIP.AUTO: NEGATIVE
NITRITE UR QL STRIP: NEGATIVE
PH UR STRIP: 7 [PH] (ref 5–8)
PH UR STRIP: 8.5 [PH] (ref 5–7.5)
PHOSPHATE SERPL-MCNC: 3.5 MG/DL (ref 2.6–4.7)
PHOSPHATE SERPL-MCNC: 4.1 MG/DL (ref 2.6–4.7)
PHOSPHATE SERPL-MCNC: 6.1 MG/DL (ref 2.6–4.7)
PLATELET # BLD AUTO: 164 K/UL (ref 150–400)
PLATELET # BLD AUTO: 219 K/UL (ref 150–400)
PLATELET # BLD AUTO: 230 K/UL (ref 150–400)
PLATELET # BLD AUTO: 236 K/UL (ref 150–400)
PLATELET # BLD AUTO: 238 K/UL (ref 150–400)
PLATELET # BLD AUTO: 263 K/UL (ref 150–400)
PLATELET # BLD AUTO: 346 K/UL (ref 150–400)
PLATELET # BLD AUTO: 347 K/UL (ref 150–400)
PLATELET # BLD AUTO: 351 K/UL (ref 150–400)
PLATELET # BLD AUTO: 373 X10E3/UL (ref 150–379)
PLATELET # BLD AUTO: 411 X10E3/UL (ref 150–379)
PLATELET # BLD AUTO: 422 X10E3/UL (ref 150–379)
PLATELET COMMENTS,PCOM: ABNORMAL
POTASSIUM SERPL-SCNC: 3.3 MMOL/L (ref 3.5–5.1)
POTASSIUM SERPL-SCNC: 3.4 MMOL/L (ref 3.5–5.1)
POTASSIUM SERPL-SCNC: 3.6 MMOL/L (ref 3.5–5.1)
POTASSIUM SERPL-SCNC: 3.7 MMOL/L (ref 3.5–5.1)
POTASSIUM SERPL-SCNC: 3.8 MMOL/L (ref 3.5–5.1)
POTASSIUM SERPL-SCNC: 4 MMOL/L (ref 3.5–5.1)
POTASSIUM SERPL-SCNC: 4.1 MMOL/L (ref 3.5–5.1)
POTASSIUM SERPL-SCNC: 4.3 MMOL/L (ref 3.5–5.1)
POTASSIUM SERPL-SCNC: 4.4 MMOL/L (ref 3.5–5.1)
POTASSIUM SERPL-SCNC: 4.4 MMOL/L (ref 3.5–5.1)
POTASSIUM SERPL-SCNC: 4.4 MMOL/L (ref 3.5–5.2)
POTASSIUM SERPL-SCNC: 4.5 MMOL/L (ref 3.5–5.1)
POTASSIUM SERPL-SCNC: 4.5 MMOL/L (ref 3.5–5.2)
POTASSIUM SERPL-SCNC: 4.9 MMOL/L (ref 3.5–5.1)
POTASSIUM SERPL-SCNC: 4.9 MMOL/L (ref 3.5–5.2)
PROT 24H UR-MRATE: 1624 MG/24HR
PROT SERPL-MCNC: 5.7 G/DL (ref 6–8.5)
PROT SERPL-MCNC: 5.9 G/DL (ref 6.4–8.2)
PROT SERPL-MCNC: 5.9 G/DL (ref 6.4–8.2)
PROT SERPL-MCNC: 6 G/DL (ref 6.4–8.2)
PROT SERPL-MCNC: 6 G/DL (ref 6.4–8.2)
PROT SERPL-MCNC: 6 G/DL (ref 6–8.5)
PROT SERPL-MCNC: 6.3 G/DL (ref 6.4–8.2)
PROT SERPL-MCNC: 6.5 G/DL (ref 6.4–8.2)
PROT SERPL-MCNC: 6.5 G/DL (ref 6–8.5)
PROT SERPL-MCNC: 6.6 G/DL (ref 6.4–8.2)
PROT SERPL-MCNC: 6.7 G/DL (ref 6.4–8.2)
PROT SERPL-MCNC: 6.7 G/DL (ref 6.4–8.2)
PROT UR QL STRIP: ABNORMAL
PROT UR STRIP-MCNC: 100 MG/DL
RBC # BLD AUTO: 3.08 M/UL (ref 3.8–5.2)
RBC # BLD AUTO: 3.09 M/UL (ref 3.8–5.2)
RBC # BLD AUTO: 3.14 M/UL (ref 3.8–5.2)
RBC # BLD AUTO: 3.25 M/UL (ref 3.8–5.2)
RBC # BLD AUTO: 3.35 M/UL (ref 3.8–5.2)
RBC # BLD AUTO: 3.5 X10E6/UL (ref 3.77–5.28)
RBC # BLD AUTO: 3.54 M/UL (ref 3.8–5.2)
RBC # BLD AUTO: 3.55 M/UL (ref 3.8–5.2)
RBC # BLD AUTO: 3.72 X10E6/UL (ref 3.77–5.28)
RBC # BLD AUTO: 3.74 M/UL (ref 3.8–5.2)
RBC # BLD AUTO: 3.9 X10E6/UL (ref 3.77–5.28)
RBC # BLD AUTO: 3.92 M/UL (ref 3.8–5.2)
RBC #/AREA URNS HPF: ABNORMAL /HPF
RBC #/AREA URNS HPF: ABNORMAL /HPF (ref 0–5)
RBC MORPH BLD: ABNORMAL
SERVICE CMNT-IMP: ABNORMAL
SODIUM SERPL-SCNC: 129 MMOL/L (ref 136–145)
SODIUM SERPL-SCNC: 130 MMOL/L (ref 134–144)
SODIUM SERPL-SCNC: 131 MMOL/L (ref 136–145)
SODIUM SERPL-SCNC: 133 MMOL/L (ref 134–144)
SODIUM SERPL-SCNC: 133 MMOL/L (ref 136–145)
SODIUM SERPL-SCNC: 134 MMOL/L (ref 136–145)
SODIUM SERPL-SCNC: 134 MMOL/L (ref 136–145)
SODIUM SERPL-SCNC: 135 MMOL/L (ref 136–145)
SODIUM SERPL-SCNC: 136 MMOL/L (ref 136–145)
SODIUM SERPL-SCNC: 136 MMOL/L (ref 136–145)
SODIUM SERPL-SCNC: 137 MMOL/L (ref 134–144)
SODIUM SERPL-SCNC: 138 MMOL/L (ref 136–145)
SODIUM SERPL-SCNC: 139 MMOL/L (ref 136–145)
SODIUM UR-SCNC: 44 MMOL/L
SP GR UR REFRACTOMETRY: 1.02 (ref 1–1.03)
SP GR UR: 1.02 (ref 1–1.03)
SP1: NORMAL
SP2: NORMAL
SP3: NORMAL
SPECIMEN EXP DATE BLD: NORMAL
SPECIMEN VOL ?TM UR: 700 ML
STATUS OF UNIT,%ST: NORMAL
UA: UC IF INDICATED,UAUC: ABNORMAL
UNIT DIVISION, %UDIV: 0
UROBILINOGEN UR QL STRIP.AUTO: 0.2 EU/DL (ref 0.2–1)
UROBILINOGEN UR STRIP-MCNC: 0.2 MG/DL (ref 0.2–1)
WBC # BLD AUTO: 13.4 K/UL (ref 3.6–11)
WBC # BLD AUTO: 3 K/UL (ref 3.6–11)
WBC # BLD AUTO: 4.5 K/UL (ref 3.6–11)
WBC # BLD AUTO: 4.8 K/UL (ref 3.6–11)
WBC # BLD AUTO: 5.2 K/UL (ref 3.6–11)
WBC # BLD AUTO: 5.4 K/UL (ref 3.6–11)
WBC # BLD AUTO: 5.4 X10E3/UL (ref 3.4–10.8)
WBC # BLD AUTO: 5.5 K/UL (ref 3.6–11)
WBC # BLD AUTO: 5.9 K/UL (ref 3.6–11)
WBC # BLD AUTO: 6.1 K/UL (ref 3.6–11)
WBC # BLD AUTO: 6.6 X10E3/UL (ref 3.4–10.8)
WBC # BLD AUTO: 6.7 X10E3/UL (ref 3.4–10.8)
WBC #/AREA URNS HPF: ABNORMAL /HPF
WBC MORPH BLD: ABNORMAL
WBC URNS QL MICRO: ABNORMAL /HPF (ref 0–4)

## 2017-01-01 PROCEDURE — C1769 GUIDE WIRE: HCPCS

## 2017-01-01 PROCEDURE — 82570 ASSAY OF URINE CREATININE: CPT | Performed by: OBSTETRICS & GYNECOLOGY

## 2017-01-01 PROCEDURE — C1729 CATH, DRAINAGE: HCPCS

## 2017-01-01 PROCEDURE — 0651 HSPC ROUTINE HOME CARE

## 2017-01-01 PROCEDURE — 72100 X-RAY EXAM L-S SPINE 2/3 VWS: CPT

## 2017-01-01 PROCEDURE — 86900 BLOOD TYPING SEROLOGIC ABO: CPT | Performed by: NURSE PRACTITIONER

## 2017-01-01 PROCEDURE — 96361 HYDRATE IV INFUSION ADD-ON: CPT

## 2017-01-01 PROCEDURE — 85025 COMPLETE CBC W/AUTO DIFF WBC: CPT | Performed by: NURSE PRACTITIONER

## 2017-01-01 PROCEDURE — 65210000002 HC RM PRIVATE GYN

## 2017-01-01 PROCEDURE — A4221 SUPP NON-INSULIN INF CATH/WK: HCPCS

## 2017-01-01 PROCEDURE — 74011250636 HC RX REV CODE- 250/636: Performed by: OBSTETRICS & GYNECOLOGY

## 2017-01-01 PROCEDURE — 87077 CULTURE AEROBIC IDENTIFY: CPT | Performed by: EMERGENCY MEDICINE

## 2017-01-01 PROCEDURE — G0156 HHCP-SVS OF AIDE,EA 15 MIN: HCPCS

## 2017-01-01 PROCEDURE — 36415 COLL VENOUS BLD VENIPUNCTURE: CPT | Performed by: INTERNAL MEDICINE

## 2017-01-01 PROCEDURE — 77030013169 SET IV BLD ICUM -A

## 2017-01-01 PROCEDURE — 76770 US EXAM ABDO BACK WALL COMP: CPT

## 2017-01-01 PROCEDURE — 74011250637 HC RX REV CODE- 250/637: Performed by: OBSTETRICS & GYNECOLOGY

## 2017-01-01 PROCEDURE — T4528 ADULT SIZE PULL-ON XL: HCPCS

## 2017-01-01 PROCEDURE — 96360 HYDRATION IV INFUSION INIT: CPT

## 2017-01-01 PROCEDURE — 96367 TX/PROPH/DG ADDL SEQ IV INF: CPT

## 2017-01-01 PROCEDURE — 80053 COMPREHEN METABOLIC PANEL: CPT | Performed by: OBSTETRICS & GYNECOLOGY

## 2017-01-01 PROCEDURE — 74011250636 HC RX REV CODE- 250/636: Performed by: NURSE PRACTITIONER

## 2017-01-01 PROCEDURE — 85025 COMPLETE CBC W/AUTO DIFF WBC: CPT | Performed by: OBSTETRICS & GYNECOLOGY

## 2017-01-01 PROCEDURE — A9270 NON-COVERED ITEM OR SERVICE: HCPCS

## 2017-01-01 PROCEDURE — 83735 ASSAY OF MAGNESIUM: CPT | Performed by: OBSTETRICS & GYNECOLOGY

## 2017-01-01 PROCEDURE — G0299 HHS/HOSPICE OF RN EA 15 MIN: HCPCS

## 2017-01-01 PROCEDURE — 74011250637 HC RX REV CODE- 250/637: Performed by: NURSE PRACTITIONER

## 2017-01-01 PROCEDURE — 77030009378 HC DEV TORQ OLCT F/GWIRE MANIP COOK -A

## 2017-01-01 PROCEDURE — 77030003504 HC NDL BIOP TISS COOK -A

## 2017-01-01 PROCEDURE — 96375 TX/PRO/DX INJ NEW DRUG ADDON: CPT

## 2017-01-01 PROCEDURE — 84300 ASSAY OF URINE SODIUM: CPT | Performed by: OBSTETRICS & GYNECOLOGY

## 2017-01-01 PROCEDURE — 36430 TRANSFUSION BLD/BLD COMPNT: CPT

## 2017-01-01 PROCEDURE — 77030020847 HC STATLOK BARD -A

## 2017-01-01 PROCEDURE — 77030026438 HC STYL ET INTUB CARD -A: Performed by: ANESTHESIOLOGY

## 2017-01-01 PROCEDURE — C2617 STENT, NON-COR, TEM W/O DEL: HCPCS

## 2017-01-01 PROCEDURE — HOSPICE MEDICATION HC HH HOSPICE MEDICATION

## 2017-01-01 PROCEDURE — 74011000250 HC RX REV CODE- 250

## 2017-01-01 PROCEDURE — 74011250636 HC RX REV CODE- 250/636: Performed by: ANESTHESIOLOGY

## 2017-01-01 PROCEDURE — 76210000026 HC REC RM PH II 1 TO 1.5 HR: Performed by: OBSTETRICS & GYNECOLOGY

## 2017-01-01 PROCEDURE — 74011000250 HC RX REV CODE- 250: Performed by: RADIOLOGY

## 2017-01-01 PROCEDURE — 50695 PLMT URETERAL STENT PRQ: CPT

## 2017-01-01 PROCEDURE — 85025 COMPLETE CBC W/AUTO DIFF WBC: CPT | Performed by: EMERGENCY MEDICINE

## 2017-01-01 PROCEDURE — P9016 RBC LEUKOCYTES REDUCED: HCPCS | Performed by: NURSE PRACTITIONER

## 2017-01-01 PROCEDURE — 36415 COLL VENOUS BLD VENIPUNCTURE: CPT | Performed by: EMERGENCY MEDICINE

## 2017-01-01 PROCEDURE — 96366 THER/PROPH/DIAG IV INF ADDON: CPT

## 2017-01-01 PROCEDURE — 77030013079 HC BLNKT BAIR HGGR 3M -A: Performed by: ANESTHESIOLOGY

## 2017-01-01 PROCEDURE — 90935 HEMODIALYSIS ONE EVALUATION: CPT

## 2017-01-01 PROCEDURE — 36556 INSERT NON-TUNNEL CV CATH: CPT

## 2017-01-01 PROCEDURE — G0300 HHS/HOSPICE OF LPN EA 15 MIN: HCPCS

## 2017-01-01 PROCEDURE — A4927 NON-STERILE GLOVES: HCPCS

## 2017-01-01 PROCEDURE — 77030012965 HC NDL HUBR BBMI -A

## 2017-01-01 PROCEDURE — 96413 CHEMO IV INFUSION 1 HR: CPT

## 2017-01-01 PROCEDURE — 36415 COLL VENOUS BLD VENIPUNCTURE: CPT | Performed by: OBSTETRICS & GYNECOLOGY

## 2017-01-01 PROCEDURE — 77030002986 HC SUT PROL J&J -A: Performed by: OBSTETRICS & GYNECOLOGY

## 2017-01-01 PROCEDURE — 74011000250 HC RX REV CODE- 250: Performed by: INTERNAL MEDICINE

## 2017-01-01 PROCEDURE — 76010000149 HC OR TIME 1 TO 1.5 HR: Performed by: OBSTETRICS & GYNECOLOGY

## 2017-01-01 PROCEDURE — 74011250636 HC RX REV CODE- 250/636

## 2017-01-01 PROCEDURE — 77030004561 HC CATH ANGI DX COBRA ANGI -B

## 2017-01-01 PROCEDURE — G0155 HHCP-SVS OF CSW,EA 15 MIN: HCPCS

## 2017-01-01 PROCEDURE — 99285 EMERGENCY DEPT VISIT HI MDM: CPT

## 2017-01-01 PROCEDURE — 74011250637 HC RX REV CODE- 250/637: Performed by: INTERNAL MEDICINE

## 2017-01-01 PROCEDURE — 50435 EXCHANGE NEPHROSTOMY CATH: CPT

## 2017-01-01 PROCEDURE — C1892 INTRO/SHEATH,FIXED,PEEL-AWAY: HCPCS

## 2017-01-01 PROCEDURE — 99211 OFF/OP EST MAY X REQ PHY/QHP: CPT

## 2017-01-01 PROCEDURE — T4543 ADULT DISP BRIEF/DIAP ABV XL: HCPCS

## 2017-01-01 PROCEDURE — 83735 ASSAY OF MAGNESIUM: CPT | Performed by: NURSE PRACTITIONER

## 2017-01-01 PROCEDURE — 74011000258 HC RX REV CODE- 258: Performed by: OBSTETRICS & GYNECOLOGY

## 2017-01-01 PROCEDURE — 81001 URINALYSIS AUTO W/SCOPE: CPT | Performed by: EMERGENCY MEDICINE

## 2017-01-01 PROCEDURE — 36415 COLL VENOUS BLD VENIPUNCTURE: CPT | Performed by: NURSE PRACTITIONER

## 2017-01-01 PROCEDURE — 80053 COMPREHEN METABOLIC PANEL: CPT | Performed by: NURSE PRACTITIONER

## 2017-01-01 PROCEDURE — 74011250636 HC RX REV CODE- 250/636: Performed by: INTERNAL MEDICINE

## 2017-01-01 PROCEDURE — 36589 REMOVAL TUNNELED CV CATH: CPT

## 2017-01-01 PROCEDURE — 86923 COMPATIBILITY TEST ELECTRIC: CPT | Performed by: OBSTETRICS & GYNECOLOGY

## 2017-01-01 PROCEDURE — A6259 TRANSPARENT FILM > 48 SQ IN: HCPCS

## 2017-01-01 PROCEDURE — 96374 THER/PROPH/DIAG INJ IV PUSH: CPT

## 2017-01-01 PROCEDURE — 74011636320 HC RX REV CODE- 636/320

## 2017-01-01 PROCEDURE — 74011250637 HC RX REV CODE- 250/637: Performed by: ANESTHESIOLOGY

## 2017-01-01 PROCEDURE — 76857 US EXAM PELVIC LIMITED: CPT

## 2017-01-01 PROCEDURE — 77030029131 HC ADMN ST IV BLD N DEHP ICUM -B

## 2017-01-01 PROCEDURE — 84100 ASSAY OF PHOSPHORUS: CPT | Performed by: NURSE PRACTITIONER

## 2017-01-01 PROCEDURE — 77030010548 HC BG WND DRN ARMD -B

## 2017-01-01 PROCEDURE — 84156 ASSAY OF PROTEIN URINE: CPT | Performed by: OBSTETRICS & GYNECOLOGY

## 2017-01-01 PROCEDURE — 50432 PLMT NEPHROSTOMY CATHETER: CPT

## 2017-01-01 PROCEDURE — T4541 LARGE DISPOSABLE UNDERPAD: HCPCS

## 2017-01-01 PROCEDURE — 80053 COMPREHEN METABOLIC PANEL: CPT | Performed by: EMERGENCY MEDICINE

## 2017-01-01 PROCEDURE — 77030034850

## 2017-01-01 PROCEDURE — 3336500001 HSPC ELECTION

## 2017-01-01 PROCEDURE — 76830 TRANSVAGINAL US NON-OB: CPT

## 2017-01-01 PROCEDURE — 30233N1 TRANSFUSION OF NONAUTOLOGOUS RED BLOOD CELLS INTO PERIPHERAL VEIN, PERCUTANEOUS APPROACH: ICD-10-PCS | Performed by: OBSTETRICS & GYNECOLOGY

## 2017-01-01 PROCEDURE — 74011000250 HC RX REV CODE- 250: Performed by: OBSTETRICS & GYNECOLOGY

## 2017-01-01 PROCEDURE — 0655 HSPC INPATIENT RESPITE

## 2017-01-01 PROCEDURE — 80069 RENAL FUNCTION PANEL: CPT | Performed by: INTERNAL MEDICINE

## 2017-01-01 PROCEDURE — 99212 OFFICE O/P EST SF 10 MIN: CPT

## 2017-01-01 PROCEDURE — 0T9030Z DRAINAGE OF RIGHT KIDNEY WITH DRAINAGE DEVICE, PERCUTANEOUS APPROACH: ICD-10-PCS | Performed by: RADIOLOGY

## 2017-01-01 PROCEDURE — 86923 COMPATIBILITY TEST ELECTRIC: CPT | Performed by: NURSE PRACTITIONER

## 2017-01-01 PROCEDURE — 74011250637 HC RX REV CODE- 250/637: Performed by: UROLOGY

## 2017-01-01 PROCEDURE — 80074 ACUTE HEPATITIS PANEL: CPT | Performed by: INTERNAL MEDICINE

## 2017-01-01 PROCEDURE — 36415 COLL VENOUS BLD VENIPUNCTURE: CPT | Performed by: ANESTHESIOLOGY

## 2017-01-01 PROCEDURE — A6402 STERILE GAUZE <= 16 SQ IN: HCPCS

## 2017-01-01 PROCEDURE — 77030015396 HC CATH DRN ABSC ANGI -C

## 2017-01-01 PROCEDURE — 77030002996 HC SUT SLK J&J -A

## 2017-01-01 PROCEDURE — 74011250636 HC RX REV CODE- 250/636: Performed by: RADIOLOGY

## 2017-01-01 PROCEDURE — 5A1D60Z PERFORMANCE OF URINARY FILTRATION, MULTIPLE: ICD-10-PCS | Performed by: INTERNAL MEDICINE

## 2017-01-01 PROCEDURE — 81025 URINE PREGNANCY TEST: CPT

## 2017-01-01 PROCEDURE — 99152 MOD SED SAME PHYS/QHP 5/>YRS: CPT

## 2017-01-01 PROCEDURE — 86900 BLOOD TYPING SEROLOGIC ABO: CPT | Performed by: ANESTHESIOLOGY

## 2017-01-01 PROCEDURE — 74011000250 HC RX REV CODE- 250: Performed by: ANESTHESIOLOGY

## 2017-01-01 PROCEDURE — 0T763DZ DILATION OF RIGHT URETER WITH INTRALUMINAL DEVICE, PERCUTANEOUS APPROACH: ICD-10-PCS | Performed by: RADIOLOGY

## 2017-01-01 PROCEDURE — 87186 SC STD MICRODIL/AGAR DIL: CPT | Performed by: EMERGENCY MEDICINE

## 2017-01-01 PROCEDURE — 74176 CT ABD & PELVIS W/O CONTRAST: CPT

## 2017-01-01 PROCEDURE — C1752 CATH,HEMODIALYSIS,SHORT-TERM: HCPCS

## 2017-01-01 PROCEDURE — 02PYX3Z REMOVAL OF INFUSION DEVICE FROM GREAT VESSEL, EXTERNAL APPROACH: ICD-10-PCS | Performed by: OBSTETRICS & GYNECOLOGY

## 2017-01-01 PROCEDURE — 74011636320 HC RX REV CODE- 636/320: Performed by: RADIOLOGY

## 2017-01-01 PROCEDURE — P9016 RBC LEUKOCYTES REDUCED: HCPCS | Performed by: OBSTETRICS & GYNECOLOGY

## 2017-01-01 PROCEDURE — 36592 COLLECT BLOOD FROM PICC: CPT

## 2017-01-01 PROCEDURE — A6258 TRANSPARENT FILM >16<=48 IN: HCPCS

## 2017-01-01 PROCEDURE — 71010 XR CHEST PORT: CPT

## 2017-01-01 PROCEDURE — 76210000006 HC OR PH I REC 0.5 TO 1 HR: Performed by: OBSTETRICS & GYNECOLOGY

## 2017-01-01 PROCEDURE — BT111ZZ FLUOROSCOPY OF RIGHT KIDNEY USING LOW OSMOLAR CONTRAST: ICD-10-PCS | Performed by: RADIOLOGY

## 2017-01-01 PROCEDURE — 74011000258 HC RX REV CODE- 258: Performed by: INTERNAL MEDICINE

## 2017-01-01 PROCEDURE — A6250 SKIN SEAL PROTECT MOISTURIZR: HCPCS

## 2017-01-01 PROCEDURE — 86900 BLOOD TYPING SEROLOGIC ABO: CPT | Performed by: OBSTETRICS & GYNECOLOGY

## 2017-01-01 PROCEDURE — 73502 X-RAY EXAM HIP UNI 2-3 VIEWS: CPT

## 2017-01-01 PROCEDURE — 02HV33Z INSERTION OF INFUSION DEVICE INTO SUPERIOR VENA CAVA, PERCUTANEOUS APPROACH: ICD-10-PCS | Performed by: RADIOLOGY

## 2017-01-01 PROCEDURE — HHS10554 SHAMPOO/BODY WASH 8 OZ ALOE VESTA

## 2017-01-01 PROCEDURE — C1894 INTRO/SHEATH, NON-LASER: HCPCS

## 2017-01-01 PROCEDURE — 87086 URINE CULTURE/COLONY COUNT: CPT | Performed by: EMERGENCY MEDICINE

## 2017-01-01 PROCEDURE — 76060000033 HC ANESTHESIA 1 TO 1.5 HR: Performed by: OBSTETRICS & GYNECOLOGY

## 2017-01-01 PROCEDURE — 71020 XR CHEST PA LAT: CPT

## 2017-01-01 PROCEDURE — 74011000250 HC RX REV CODE- 250: Performed by: NURSE PRACTITIONER

## 2017-01-01 PROCEDURE — 77030008684 HC TU ET CUF COVD -B: Performed by: ANESTHESIOLOGY

## 2017-01-01 PROCEDURE — A5120 SKIN BARRIER, WIPE OR SWAB: HCPCS

## 2017-01-01 RX ORDER — SUCCINYLCHOLINE CHLORIDE 20 MG/ML
INJECTION INTRAMUSCULAR; INTRAVENOUS AS NEEDED
Status: DISCONTINUED | OUTPATIENT
Start: 2017-01-01 | End: 2017-01-01 | Stop reason: HOSPADM

## 2017-01-01 RX ORDER — PHENAZOPYRIDINE HYDROCHLORIDE 100 MG/1
100 TABLET, FILM COATED ORAL
Status: DISCONTINUED | OUTPATIENT
Start: 2017-01-01 | End: 2017-01-01 | Stop reason: HOSPADM

## 2017-01-01 RX ORDER — LORAZEPAM 2 MG/ML
1 INJECTION INTRAMUSCULAR
Status: DISCONTINUED | OUTPATIENT
Start: 2017-01-01 | End: 2017-01-01 | Stop reason: HOSPADM

## 2017-01-01 RX ORDER — ONDANSETRON 4 MG/1
4 TABLET, ORALLY DISINTEGRATING ORAL
Status: DISCONTINUED | OUTPATIENT
Start: 2017-01-01 | End: 2017-01-01 | Stop reason: HOSPADM

## 2017-01-01 RX ORDER — FUROSEMIDE 10 MG/ML
20 INJECTION INTRAMUSCULAR; INTRAVENOUS ONCE
Status: DISPENSED | OUTPATIENT
Start: 2017-01-01 | End: 2017-01-01

## 2017-01-01 RX ORDER — LIDOCAINE HYDROCHLORIDE 20 MG/ML
INJECTION, SOLUTION EPIDURAL; INFILTRATION; INTRACAUDAL; PERINEURAL AS NEEDED
Status: DISCONTINUED | OUTPATIENT
Start: 2017-01-01 | End: 2017-01-01 | Stop reason: HOSPADM

## 2017-01-01 RX ORDER — SULFAMETHOXAZOLE AND TRIMETHOPRIM 800; 160 MG/1; MG/1
TABLET ORAL
Refills: 0 | COMMUNITY
Start: 2017-01-01 | End: 2017-01-01 | Stop reason: ALTCHOICE

## 2017-01-01 RX ORDER — FENTANYL 12.5 UG/1
PATCH TRANSDERMAL
Refills: 0 | COMMUNITY
Start: 2017-01-01 | End: 2017-01-01

## 2017-01-01 RX ORDER — SODIUM CHLORIDE 9 MG/ML
250 INJECTION, SOLUTION INTRAVENOUS AS NEEDED
Status: DISCONTINUED | OUTPATIENT
Start: 2017-01-01 | End: 2017-01-01 | Stop reason: HOSPADM

## 2017-01-01 RX ORDER — SODIUM CHLORIDE 9 MG/ML
250 INJECTION, SOLUTION INTRAVENOUS AS NEEDED
Status: CANCELLED | OUTPATIENT
Start: 2017-01-01

## 2017-01-01 RX ORDER — CEFAZOLIN SODIUM IN 0.9 % NACL 2 G/50 ML
2 INTRAVENOUS SOLUTION, PIGGYBACK (ML) INTRAVENOUS ONCE
Status: COMPLETED | OUTPATIENT
Start: 2017-01-01 | End: 2017-01-01

## 2017-01-01 RX ORDER — FENTANYL CITRATE 50 UG/ML
25 INJECTION, SOLUTION INTRAMUSCULAR; INTRAVENOUS
Status: DISCONTINUED | OUTPATIENT
Start: 2017-01-01 | End: 2017-01-01 | Stop reason: HOSPADM

## 2017-01-01 RX ORDER — FENTANYL CITRATE 50 UG/ML
INJECTION, SOLUTION INTRAMUSCULAR; INTRAVENOUS AS NEEDED
Status: DISCONTINUED | OUTPATIENT
Start: 2017-01-01 | End: 2017-01-01 | Stop reason: HOSPADM

## 2017-01-01 RX ORDER — OXYCODONE AND ACETAMINOPHEN 5; 325 MG/1; MG/1
2 TABLET ORAL
Status: DISCONTINUED | OUTPATIENT
Start: 2017-01-01 | End: 2017-01-01 | Stop reason: HOSPADM

## 2017-01-01 RX ORDER — LIDOCAINE HYDROCHLORIDE 20 MG/ML
10 INJECTION, SOLUTION INFILTRATION; PERINEURAL ONCE
Status: DISCONTINUED | OUTPATIENT
Start: 2017-01-01 | End: 2017-01-01 | Stop reason: CLARIF

## 2017-01-01 RX ORDER — DEXAMETHASONE SODIUM PHOSPHATE 4 MG/ML
12 INJECTION, SOLUTION INTRA-ARTICULAR; INTRALESIONAL; INTRAMUSCULAR; INTRAVENOUS; SOFT TISSUE ONCE
Status: COMPLETED | OUTPATIENT
Start: 2017-01-01 | End: 2017-01-01

## 2017-01-01 RX ORDER — SODIUM CHLORIDE 9 MG/ML
50 INJECTION, SOLUTION INTRAVENOUS CONTINUOUS
Status: DISCONTINUED | OUTPATIENT
Start: 2017-01-01 | End: 2017-01-01

## 2017-01-01 RX ORDER — SODIUM CHLORIDE 9 MG/ML
500 INJECTION, SOLUTION INTRAVENOUS CONTINUOUS
Status: DISCONTINUED | OUTPATIENT
Start: 2017-01-01 | End: 2017-01-01

## 2017-01-01 RX ORDER — OXYCODONE AND ACETAMINOPHEN 10; 325 MG/1; MG/1
TABLET ORAL
Refills: 0 | COMMUNITY
Start: 2017-01-01 | End: 2017-01-01

## 2017-01-01 RX ORDER — MIDAZOLAM HYDROCHLORIDE 1 MG/ML
1 INJECTION, SOLUTION INTRAMUSCULAR; INTRAVENOUS AS NEEDED
Status: DISCONTINUED | OUTPATIENT
Start: 2017-01-01 | End: 2017-01-01 | Stop reason: HOSPADM

## 2017-01-01 RX ORDER — POTASSIUM CHLORIDE 750 MG/1
40 TABLET, FILM COATED, EXTENDED RELEASE ORAL
Status: COMPLETED | OUTPATIENT
Start: 2017-01-01 | End: 2017-01-01

## 2017-01-01 RX ORDER — SODIUM CHLORIDE 0.9 % (FLUSH) 0.9 %
10 SYRINGE (ML) INJECTION AS NEEDED
Status: DISCONTINUED | OUTPATIENT
Start: 2017-01-01 | End: 2017-01-01

## 2017-01-01 RX ORDER — SODIUM CHLORIDE 0.9 % (FLUSH) 0.9 %
5-10 SYRINGE (ML) INJECTION AS NEEDED
Status: ACTIVE | OUTPATIENT
Start: 2017-01-01 | End: 2017-01-01

## 2017-01-01 RX ORDER — SODIUM CHLORIDE 0.9 % (FLUSH) 0.9 %
10-40 SYRINGE (ML) INJECTION AS NEEDED
Status: ACTIVE | OUTPATIENT
Start: 2017-01-01 | End: 2017-01-01

## 2017-01-01 RX ORDER — HEPARIN 100 UNIT/ML
500 SYRINGE INTRAVENOUS AS NEEDED
Status: CANCELLED | OUTPATIENT
Start: 2017-01-01 | End: 2017-01-01

## 2017-01-01 RX ORDER — SODIUM CHLORIDE 9 MG/ML
1000 INJECTION, SOLUTION INTRAVENOUS ONCE
Status: COMPLETED | OUTPATIENT
Start: 2017-01-01 | End: 2017-01-01

## 2017-01-01 RX ORDER — MAGNESIUM SULFATE HEPTAHYDRATE 40 MG/ML
2 INJECTION, SOLUTION INTRAVENOUS ONCE
Status: COMPLETED | OUTPATIENT
Start: 2017-01-01 | End: 2017-01-01

## 2017-01-01 RX ORDER — SODIUM CHLORIDE 9 MG/ML
25 INJECTION, SOLUTION INTRAVENOUS AS NEEDED
Status: DISPENSED | OUTPATIENT
Start: 2017-01-01 | End: 2017-01-01

## 2017-01-01 RX ORDER — MIDAZOLAM HYDROCHLORIDE 1 MG/ML
0.5 INJECTION, SOLUTION INTRAMUSCULAR; INTRAVENOUS
Status: DISCONTINUED | OUTPATIENT
Start: 2017-01-01 | End: 2017-01-01 | Stop reason: HOSPADM

## 2017-01-01 RX ORDER — MIDAZOLAM HYDROCHLORIDE 1 MG/ML
INJECTION, SOLUTION INTRAMUSCULAR; INTRAVENOUS
Status: DISCONTINUED
Start: 2017-01-01 | End: 2017-01-01

## 2017-01-01 RX ORDER — OXYCODONE AND ACETAMINOPHEN 10; 325 MG/1; MG/1
1 TABLET ORAL
Qty: 30 TAB | Refills: 0 | Status: SHIPPED | OUTPATIENT
Start: 2017-01-01 | End: 2017-01-01 | Stop reason: SDUPTHER

## 2017-01-01 RX ORDER — METOCLOPRAMIDE 10 MG/1
10 TABLET ORAL
Qty: 30 TAB | Refills: 0 | Status: SHIPPED | OUTPATIENT
Start: 2017-01-01 | End: 2017-01-01

## 2017-01-01 RX ORDER — MIDAZOLAM HYDROCHLORIDE 1 MG/ML
5 INJECTION, SOLUTION INTRAMUSCULAR; INTRAVENOUS
Status: DISCONTINUED | OUTPATIENT
Start: 2017-01-01 | End: 2017-01-01

## 2017-01-01 RX ORDER — LEVOFLOXACIN 5 MG/ML
500 INJECTION, SOLUTION INTRAVENOUS ONCE
Status: COMPLETED | OUTPATIENT
Start: 2017-01-01 | End: 2017-01-01

## 2017-01-01 RX ORDER — METOCLOPRAMIDE HYDROCHLORIDE 5 MG/ML
10 INJECTION INTRAMUSCULAR; INTRAVENOUS EVERY 6 HOURS
Status: DISCONTINUED | OUTPATIENT
Start: 2017-01-01 | End: 2017-01-01 | Stop reason: HOSPADM

## 2017-01-01 RX ORDER — LEVOFLOXACIN 500 MG/1
500 TABLET, FILM COATED ORAL DAILY
Qty: 7 TAB | Refills: 0 | Status: SHIPPED | OUTPATIENT
Start: 2017-01-01

## 2017-01-01 RX ORDER — LORAZEPAM 2 MG/ML
2 CONCENTRATE ORAL
Status: DISCONTINUED | OUTPATIENT
Start: 2017-01-01 | End: 2017-04-29 | Stop reason: HOSPADM

## 2017-01-01 RX ORDER — OXYCODONE AND ACETAMINOPHEN 5; 325 MG/1; MG/1
1 TABLET ORAL
Qty: 30 TAB | Refills: 0 | Status: SHIPPED | OUTPATIENT
Start: 2017-01-01 | End: 2017-01-01

## 2017-01-01 RX ORDER — OXYCODONE AND ACETAMINOPHEN 10; 325 MG/1; MG/1
1 TABLET ORAL
Qty: 60 TAB | Refills: 0 | Status: SHIPPED | OUTPATIENT
Start: 2017-01-01

## 2017-01-01 RX ORDER — FENTANYL CITRATE 50 UG/ML
200 INJECTION, SOLUTION INTRAMUSCULAR; INTRAVENOUS
Status: DISCONTINUED | OUTPATIENT
Start: 2017-01-01 | End: 2017-01-01

## 2017-01-01 RX ORDER — SODIUM CHLORIDE 0.9 % (FLUSH) 0.9 %
5-10 SYRINGE (ML) INJECTION AS NEEDED
Status: DISCONTINUED | OUTPATIENT
Start: 2017-01-01 | End: 2017-01-01 | Stop reason: HOSPADM

## 2017-01-01 RX ORDER — SODIUM CHLORIDE 0.9 % (FLUSH) 0.9 %
5-10 SYRINGE (ML) INJECTION EVERY 8 HOURS
Status: DISCONTINUED | OUTPATIENT
Start: 2017-01-01 | End: 2017-01-01 | Stop reason: HOSPADM

## 2017-01-01 RX ORDER — GRANISETRON HYDROCHLORIDE 1 MG/ML
1 INJECTION, SOLUTION INTRAVENOUS ONCE
Status: COMPLETED | OUTPATIENT
Start: 2017-01-01 | End: 2017-01-01

## 2017-01-01 RX ORDER — MORPHINE SULFATE 20 MG/ML
20 SOLUTION ORAL DAILY PRN
Status: DISCONTINUED | OUTPATIENT
Start: 2017-01-01 | End: 2017-01-01 | Stop reason: CLARIF

## 2017-01-01 RX ORDER — HEPARIN SODIUM 1000 [USP'U]/ML
INJECTION, SOLUTION INTRAVENOUS; SUBCUTANEOUS
Status: COMPLETED
Start: 2017-01-01 | End: 2017-01-01

## 2017-01-01 RX ORDER — DEXAMETHASONE SODIUM PHOSPHATE 4 MG/ML
INJECTION, SOLUTION INTRA-ARTICULAR; INTRALESIONAL; INTRAMUSCULAR; INTRAVENOUS; SOFT TISSUE AS NEEDED
Status: DISCONTINUED | OUTPATIENT
Start: 2017-01-01 | End: 2017-01-01 | Stop reason: HOSPADM

## 2017-01-01 RX ORDER — OXYCODONE HCL 20 MG/ML
10 CONCENTRATE, ORAL ORAL
Status: DISCONTINUED | OUTPATIENT
Start: 2017-01-01 | End: 2017-04-29 | Stop reason: HOSPADM

## 2017-01-01 RX ORDER — SODIUM CHLORIDE, SODIUM LACTATE, POTASSIUM CHLORIDE, CALCIUM CHLORIDE 600; 310; 30; 20 MG/100ML; MG/100ML; MG/100ML; MG/100ML
100 INJECTION, SOLUTION INTRAVENOUS CONTINUOUS
Status: DISCONTINUED | OUTPATIENT
Start: 2017-01-01 | End: 2017-01-01 | Stop reason: HOSPADM

## 2017-01-01 RX ORDER — OXYCODONE AND ACETAMINOPHEN 5; 325 MG/1; MG/1
TABLET ORAL
Status: DISCONTINUED
Start: 2017-01-01 | End: 2017-01-01 | Stop reason: HOSPADM

## 2017-01-01 RX ORDER — METOCLOPRAMIDE 10 MG/1
10 TABLET ORAL
Qty: 40 TAB | Refills: 0 | Status: SHIPPED | OUTPATIENT
Start: 2017-01-01 | End: 2017-01-01

## 2017-01-01 RX ORDER — MIDAZOLAM HYDROCHLORIDE 1 MG/ML
INJECTION, SOLUTION INTRAMUSCULAR; INTRAVENOUS AS NEEDED
Status: DISCONTINUED | OUTPATIENT
Start: 2017-01-01 | End: 2017-01-01 | Stop reason: HOSPADM

## 2017-01-01 RX ORDER — MORPHINE SULFATE 15 MG/1
TABLET ORAL
Refills: 0 | COMMUNITY
Start: 2016-01-01 | End: 2017-01-01 | Stop reason: ALTCHOICE

## 2017-01-01 RX ORDER — SODIUM CHLORIDE, SODIUM LACTATE, POTASSIUM CHLORIDE, CALCIUM CHLORIDE 600; 310; 30; 20 MG/100ML; MG/100ML; MG/100ML; MG/100ML
INJECTION, SOLUTION INTRAVENOUS
Status: DISCONTINUED | OUTPATIENT
Start: 2017-01-01 | End: 2017-01-01 | Stop reason: HOSPADM

## 2017-01-01 RX ORDER — HEPARIN 100 UNIT/ML
500 SYRINGE INTRAVENOUS AS NEEDED
Status: ACTIVE | OUTPATIENT
Start: 2017-01-01 | End: 2017-01-01

## 2017-01-01 RX ORDER — BACITRACIN 500 UNIT/G
PACKET (EA) TOPICAL
Status: DISCONTINUED
Start: 2017-01-01 | End: 2017-01-01 | Stop reason: HOSPADM

## 2017-01-01 RX ORDER — PROPOFOL 10 MG/ML
INJECTION, EMULSION INTRAVENOUS AS NEEDED
Status: DISCONTINUED | OUTPATIENT
Start: 2017-01-01 | End: 2017-01-01 | Stop reason: HOSPADM

## 2017-01-01 RX ORDER — SODIUM CHLORIDE 9 MG/ML
25 INJECTION, SOLUTION INTRAVENOUS AS NEEDED
Status: DISCONTINUED | OUTPATIENT
Start: 2017-01-01 | End: 2017-01-01

## 2017-01-01 RX ORDER — SODIUM CHLORIDE 0.9 % (FLUSH) 0.9 %
10-40 SYRINGE (ML) INJECTION AS NEEDED
Status: DISCONTINUED | OUTPATIENT
Start: 2017-01-01 | End: 2017-01-01 | Stop reason: HOSPADM

## 2017-01-01 RX ORDER — LIDOCAINE HYDROCHLORIDE 20 MG/ML
200 INJECTION, SOLUTION INFILTRATION; PERINEURAL ONCE
Status: DISCONTINUED | OUTPATIENT
Start: 2017-01-01 | End: 2017-01-01 | Stop reason: ALTCHOICE

## 2017-01-01 RX ORDER — FENTANYL CITRATE 50 UG/ML
50 INJECTION, SOLUTION INTRAMUSCULAR; INTRAVENOUS AS NEEDED
Status: DISCONTINUED | OUTPATIENT
Start: 2017-01-01 | End: 2017-01-01 | Stop reason: HOSPADM

## 2017-01-01 RX ORDER — SODIUM CHLORIDE 9 MG/ML
10 INJECTION INTRAMUSCULAR; INTRAVENOUS; SUBCUTANEOUS AS NEEDED
Status: ACTIVE | OUTPATIENT
Start: 2017-01-01 | End: 2017-01-01

## 2017-01-01 RX ORDER — SODIUM CHLORIDE, SODIUM LACTATE, POTASSIUM CHLORIDE, CALCIUM CHLORIDE 600; 310; 30; 20 MG/100ML; MG/100ML; MG/100ML; MG/100ML
125 INJECTION, SOLUTION INTRAVENOUS CONTINUOUS
Status: DISCONTINUED | OUTPATIENT
Start: 2017-01-01 | End: 2017-01-01

## 2017-01-01 RX ORDER — SODIUM CHLORIDE 9 MG/ML
10 INJECTION INTRAMUSCULAR; INTRAVENOUS; SUBCUTANEOUS AS NEEDED
Status: CANCELLED | OUTPATIENT
Start: 2017-01-01 | End: 2017-01-01

## 2017-01-01 RX ORDER — OXYCODONE AND ACETAMINOPHEN 10; 325 MG/1; MG/1
1 TABLET ORAL
Qty: 25 TAB | Refills: 0 | Status: SHIPPED | OUTPATIENT
Start: 2017-01-01 | End: 2017-01-01 | Stop reason: SDUPTHER

## 2017-01-01 RX ORDER — DIPHENOXYLATE HYDROCHLORIDE AND ATROPINE SULFATE 2.5; .025 MG/1; MG/1
1 TABLET ORAL
Status: DISCONTINUED | OUTPATIENT
Start: 2017-01-01 | End: 2017-01-01 | Stop reason: HOSPADM

## 2017-01-01 RX ORDER — ROCURONIUM BROMIDE 10 MG/ML
INJECTION, SOLUTION INTRAVENOUS AS NEEDED
Status: DISCONTINUED | OUTPATIENT
Start: 2017-01-01 | End: 2017-01-01 | Stop reason: HOSPADM

## 2017-01-01 RX ORDER — PROCHLORPERAZINE MALEATE 10 MG
TABLET ORAL
COMMUNITY
Start: 2017-01-01 | End: 2017-01-01 | Stop reason: ALTCHOICE

## 2017-01-01 RX ORDER — MORPHINE SULFATE 10 MG/ML
2 INJECTION, SOLUTION INTRAMUSCULAR; INTRAVENOUS
Status: DISCONTINUED | OUTPATIENT
Start: 2017-01-01 | End: 2017-01-01 | Stop reason: HOSPADM

## 2017-01-01 RX ORDER — ONDANSETRON 2 MG/ML
INJECTION INTRAMUSCULAR; INTRAVENOUS AS NEEDED
Status: DISCONTINUED | OUTPATIENT
Start: 2017-01-01 | End: 2017-01-01 | Stop reason: HOSPADM

## 2017-01-01 RX ORDER — SODIUM CHLORIDE 9 MG/ML
25 INJECTION, SOLUTION INTRAVENOUS CONTINUOUS
Status: DISCONTINUED | OUTPATIENT
Start: 2017-01-01 | End: 2017-01-01

## 2017-01-01 RX ORDER — HYDROMORPHONE HYDROCHLORIDE 1 MG/ML
0.2 INJECTION, SOLUTION INTRAMUSCULAR; INTRAVENOUS; SUBCUTANEOUS
Status: DISCONTINUED | OUTPATIENT
Start: 2017-01-01 | End: 2017-01-01 | Stop reason: HOSPADM

## 2017-01-01 RX ORDER — LIDOCAINE HYDROCHLORIDE 10 MG/ML
0.1 INJECTION, SOLUTION EPIDURAL; INFILTRATION; INTRACAUDAL; PERINEURAL AS NEEDED
Status: DISCONTINUED | OUTPATIENT
Start: 2017-01-01 | End: 2017-01-01 | Stop reason: HOSPADM

## 2017-01-01 RX ORDER — OXYCODONE AND ACETAMINOPHEN 5; 325 MG/1; MG/1
1 TABLET ORAL ONCE
Status: COMPLETED | OUTPATIENT
Start: 2017-01-01 | End: 2017-01-01

## 2017-01-01 RX ORDER — ACETAMINOPHEN 325 MG/1
650 TABLET ORAL
Status: DISCONTINUED | OUTPATIENT
Start: 2017-01-01 | End: 2017-01-01 | Stop reason: HOSPADM

## 2017-01-01 RX ORDER — VENLAFAXINE 37.5 MG/1
75 TABLET ORAL DAILY
Status: DISCONTINUED | OUTPATIENT
Start: 2017-01-01 | End: 2017-01-01 | Stop reason: HOSPADM

## 2017-01-01 RX ORDER — ONDANSETRON 2 MG/ML
4 INJECTION INTRAMUSCULAR; INTRAVENOUS ONCE
Status: COMPLETED | OUTPATIENT
Start: 2017-01-01 | End: 2017-01-01

## 2017-01-01 RX ORDER — MEGESTROL ACETATE 20 MG/1
40 TABLET ORAL 2 TIMES DAILY
Qty: 60 TAB | Refills: 2 | Status: SHIPPED | OUTPATIENT
Start: 2017-01-01

## 2017-01-01 RX ORDER — FUROSEMIDE 10 MG/ML
20 INJECTION INTRAMUSCULAR; INTRAVENOUS ONCE
Status: COMPLETED | OUTPATIENT
Start: 2017-01-01 | End: 2017-01-01

## 2017-01-01 RX ORDER — METHADONE HYDROCHLORIDE 10 MG/ML
5 CONCENTRATE ORAL EVERY 8 HOURS
Status: DISCONTINUED | OUTPATIENT
Start: 2017-01-01 | End: 2017-04-29 | Stop reason: HOSPADM

## 2017-01-01 RX ORDER — DICLOFENAC EPOLAMINE 0.01 MG/1
PATCH TOPICAL
Refills: 12 | COMMUNITY
Start: 2017-01-01 | End: 2017-01-01

## 2017-01-01 RX ORDER — MORPHINE SULFATE 15 MG/1
15 TABLET ORAL
Status: DISCONTINUED | OUTPATIENT
Start: 2017-01-01 | End: 2017-01-01 | Stop reason: HOSPADM

## 2017-01-01 RX ORDER — FENTANYL 12.5 UG/1
1 PATCH TRANSDERMAL
Status: DISCONTINUED | OUTPATIENT
Start: 2017-01-01 | End: 2017-01-01 | Stop reason: HOSPADM

## 2017-01-01 RX ORDER — CEPHALEXIN 500 MG/1
500 CAPSULE ORAL 2 TIMES DAILY
Qty: 14 CAP | Refills: 0 | Status: SHIPPED | OUTPATIENT
Start: 2017-01-01 | End: 2017-01-01

## 2017-01-01 RX ORDER — SODIUM CHLORIDE 0.9 % (FLUSH) 0.9 %
10-40 SYRINGE (ML) INJECTION AS NEEDED
Status: CANCELLED | OUTPATIENT
Start: 2017-01-01 | End: 2017-01-01

## 2017-01-01 RX ORDER — DIPHENHYDRAMINE HYDROCHLORIDE 50 MG/ML
12.5 INJECTION, SOLUTION INTRAMUSCULAR; INTRAVENOUS AS NEEDED
Status: DISCONTINUED | OUTPATIENT
Start: 2017-01-01 | End: 2017-01-01 | Stop reason: HOSPADM

## 2017-01-01 RX ORDER — SODIUM CHLORIDE 9 MG/ML
250 INJECTION, SOLUTION INTRAVENOUS AS NEEDED
Status: DISPENSED | OUTPATIENT
Start: 2017-01-01 | End: 2017-01-01

## 2017-01-01 RX ORDER — OXYCODONE AND ACETAMINOPHEN 5; 325 MG/1; MG/1
1 TABLET ORAL
Status: COMPLETED | OUTPATIENT
Start: 2017-01-01 | End: 2017-01-01

## 2017-01-01 RX ORDER — ACETAMINOPHEN 325 MG/1
650 TABLET ORAL ONCE
Status: COMPLETED | OUTPATIENT
Start: 2017-01-01 | End: 2017-01-01

## 2017-01-01 RX ORDER — FENTANYL CITRATE 50 UG/ML
INJECTION, SOLUTION INTRAMUSCULAR; INTRAVENOUS
Status: DISCONTINUED
Start: 2017-01-01 | End: 2017-01-01

## 2017-01-01 RX ORDER — LIDOCAINE HYDROCHLORIDE 20 MG/ML
INJECTION, SOLUTION INFILTRATION; PERINEURAL
Status: COMPLETED
Start: 2017-01-01 | End: 2017-01-01

## 2017-01-01 RX ORDER — SODIUM CHLORIDE 0.9 % (FLUSH) 0.9 %
10-40 SYRINGE (ML) INJECTION AS NEEDED
Status: CANCELLED | OUTPATIENT
Start: 2017-01-01

## 2017-01-01 RX ORDER — ONDANSETRON 2 MG/ML
INJECTION INTRAMUSCULAR; INTRAVENOUS
Status: COMPLETED
Start: 2017-01-01 | End: 2017-01-01

## 2017-01-01 RX ORDER — METHADONE HYDROCHLORIDE 5 MG/5ML
5 SOLUTION ORAL EVERY 8 HOURS
Status: DISCONTINUED | OUTPATIENT
Start: 2017-01-01 | End: 2017-01-01

## 2017-01-01 RX ORDER — PHENAZOPYRIDINE HYDROCHLORIDE 100 MG/1
100 TABLET, FILM COATED ORAL
Qty: 9 TAB | Refills: 0 | Status: SHIPPED | OUTPATIENT
Start: 2017-01-01 | End: 2017-01-01

## 2017-01-01 RX ORDER — CYCLOBENZAPRINE HCL 5 MG
5 TABLET ORAL
Qty: 30 TAB | Refills: 1 | Status: SHIPPED | OUTPATIENT
Start: 2017-01-01

## 2017-01-01 RX ORDER — LIDOCAINE HYDROCHLORIDE 20 MG/ML
INJECTION, SOLUTION INFILTRATION; PERINEURAL
Status: DISCONTINUED
Start: 2017-01-01 | End: 2017-01-01 | Stop reason: SDUPTHER

## 2017-01-01 RX ORDER — OXYBUTYNIN CHLORIDE 5 MG/1
5 TABLET, EXTENDED RELEASE ORAL DAILY
Status: DISCONTINUED | OUTPATIENT
Start: 2017-01-01 | End: 2017-01-01 | Stop reason: HOSPADM

## 2017-01-01 RX ORDER — KETOROLAC TROMETHAMINE 30 MG/ML
INJECTION, SOLUTION INTRAMUSCULAR; INTRAVENOUS AS NEEDED
Status: DISCONTINUED | OUTPATIENT
Start: 2017-01-01 | End: 2017-01-01 | Stop reason: HOSPADM

## 2017-01-01 RX ORDER — ROPIVACAINE HYDROCHLORIDE 5 MG/ML
150 INJECTION, SOLUTION EPIDURAL; INFILTRATION; PERINEURAL AS NEEDED
Status: DISCONTINUED | OUTPATIENT
Start: 2017-01-01 | End: 2017-01-01 | Stop reason: HOSPADM

## 2017-01-01 RX ADMIN — ONDANSETRON 4 MG: 2 INJECTION INTRAMUSCULAR; INTRAVENOUS at 16:52

## 2017-01-01 RX ADMIN — MIDAZOLAM HYDROCHLORIDE 1 MG: 1 INJECTION, SOLUTION INTRAMUSCULAR; INTRAVENOUS at 14:12

## 2017-01-01 RX ADMIN — SODIUM CHLORIDE 1000 ML: 900 INJECTION, SOLUTION INTRAVENOUS at 11:21

## 2017-01-01 RX ADMIN — DEXTROSE MONOHYDRATE: 5 INJECTION, SOLUTION INTRAVENOUS at 06:28

## 2017-01-01 RX ADMIN — SODIUM CHLORIDE 1000 ML: 900 INJECTION, SOLUTION INTRAVENOUS at 10:18

## 2017-01-01 RX ADMIN — OXYCODONE HYDROCHLORIDE AND ACETAMINOPHEN 2 TABLET: 5; 325 TABLET ORAL at 17:03

## 2017-01-01 RX ADMIN — OXYCODONE HYDROCHLORIDE AND ACETAMINOPHEN 2 TABLET: 5; 325 TABLET ORAL at 02:21

## 2017-01-01 RX ADMIN — LIDOCAINE HYDROCHLORIDE 100 MG: 20 INJECTION, SOLUTION EPIDURAL; INFILTRATION; INTRACAUDAL; PERINEURAL at 12:41

## 2017-01-01 RX ADMIN — OXYCODONE HYDROCHLORIDE AND ACETAMINOPHEN 2 TABLET: 5; 325 TABLET ORAL at 12:17

## 2017-01-01 RX ADMIN — OXYCODONE HYDROCHLORIDE AND ACETAMINOPHEN 2 TABLET: 5; 325 TABLET ORAL at 18:16

## 2017-01-01 RX ADMIN — Medication 10 ML: at 11:37

## 2017-01-01 RX ADMIN — OXYCODONE HYDROCHLORIDE AND ACETAMINOPHEN 2 TABLET: 5; 325 TABLET ORAL at 02:58

## 2017-01-01 RX ADMIN — ACETAMINOPHEN 650 MG: 325 TABLET, FILM COATED ORAL at 09:36

## 2017-01-01 RX ADMIN — ROCURONIUM BROMIDE 10 MG: 10 INJECTION, SOLUTION INTRAVENOUS at 12:41

## 2017-01-01 RX ADMIN — LIDOCAINE HYDROCHLORIDE 10 ML: 20 INJECTION, SOLUTION INFILTRATION; PERINEURAL at 17:06

## 2017-01-01 RX ADMIN — METOCLOPRAMIDE 10 MG: 5 INJECTION, SOLUTION INTRAMUSCULAR; INTRAVENOUS at 14:40

## 2017-01-01 RX ADMIN — PHENAZOPYRIDINE HYDROCHLORIDE 100 MG: 100 TABLET ORAL at 09:08

## 2017-01-01 RX ADMIN — OXYCODONE HYDROCHLORIDE AND ACETAMINOPHEN 2 TABLET: 5; 325 TABLET ORAL at 03:17

## 2017-01-01 RX ADMIN — CISPLATIN 70 MG: 100 INJECTION, SOLUTION INTRAVENOUS at 14:55

## 2017-01-01 RX ADMIN — OXYCODONE HYDROCHLORIDE 10 MG: 100 SOLUTION ORAL at 18:56

## 2017-01-01 RX ADMIN — VENLAFAXINE 75 MG: 37.5 TABLET ORAL at 09:25

## 2017-01-01 RX ADMIN — OXYCODONE HYDROCHLORIDE AND ACETAMINOPHEN 2 TABLET: 5; 325 TABLET ORAL at 20:35

## 2017-01-01 RX ADMIN — OXYCODONE HYDROCHLORIDE AND ACETAMINOPHEN 2 TABLET: 5; 325 TABLET ORAL at 14:39

## 2017-01-01 RX ADMIN — SODIUM CHLORIDE, SODIUM LACTATE, POTASSIUM CHLORIDE, AND CALCIUM CHLORIDE 100 ML/HR: 600; 310; 30; 20 INJECTION, SOLUTION INTRAVENOUS at 11:29

## 2017-01-01 RX ADMIN — LORAZEPAM 2 MG: 2 TABLET ORAL at 20:00

## 2017-01-01 RX ADMIN — METOCLOPRAMIDE 10 MG: 5 INJECTION, SOLUTION INTRAMUSCULAR; INTRAVENOUS at 09:09

## 2017-01-01 RX ADMIN — OXYBUTYNIN CHLORIDE 5 MG: 5 TABLET, EXTENDED RELEASE ORAL at 09:08

## 2017-01-01 RX ADMIN — Medication 20 ML: at 11:18

## 2017-01-01 RX ADMIN — IOPAMIDOL 10 ML: 612 INJECTION, SOLUTION INTRAVENOUS at 17:14

## 2017-01-01 RX ADMIN — SODIUM BICARBONATE 1 ML: 0.2 INJECTION, SOLUTION INTRAVENOUS at 14:29

## 2017-01-01 RX ADMIN — Medication 10 ML: at 18:20

## 2017-01-01 RX ADMIN — GRANISETRON HYDROCHLORIDE 1 MG: 1 INJECTION, SOLUTION INTRAVENOUS at 13:47

## 2017-01-01 RX ADMIN — MIDAZOLAM HYDROCHLORIDE 1 MG: 1 INJECTION, SOLUTION INTRAMUSCULAR; INTRAVENOUS at 14:48

## 2017-01-01 RX ADMIN — PROPOFOL 170 MG: 10 INJECTION, EMULSION INTRAVENOUS at 12:41

## 2017-01-01 RX ADMIN — METOCLOPRAMIDE 10 MG: 5 INJECTION, SOLUTION INTRAMUSCULAR; INTRAVENOUS at 09:25

## 2017-01-01 RX ADMIN — GRANISETRON HYDROCHLORIDE 1 MG: 1 INJECTION, SOLUTION INTRAVENOUS at 15:05

## 2017-01-01 RX ADMIN — GRANISETRON HYDROCHLORIDE 1 MG: 1 INJECTION, SOLUTION INTRAVENOUS at 14:15

## 2017-01-01 RX ADMIN — SODIUM CHLORIDE 150 MG: 900 INJECTION, SOLUTION INTRAVENOUS at 15:13

## 2017-01-01 RX ADMIN — SODIUM CHLORIDE, SODIUM LACTATE, POTASSIUM CHLORIDE, AND CALCIUM CHLORIDE 125 ML/HR: 600; 310; 30; 20 INJECTION, SOLUTION INTRAVENOUS at 12:17

## 2017-01-01 RX ADMIN — Medication 500 UNITS: at 16:40

## 2017-01-01 RX ADMIN — ONDANSETRON 4 MG: 4 TABLET, ORALLY DISINTEGRATING ORAL at 08:54

## 2017-01-01 RX ADMIN — DEXAMETHASONE SODIUM PHOSPHATE 12 MG: 4 INJECTION, SOLUTION INTRA-ARTICULAR; INTRALESIONAL; INTRAMUSCULAR; INTRAVENOUS; SOFT TISSUE at 14:08

## 2017-01-01 RX ADMIN — OXYCODONE HYDROCHLORIDE AND ACETAMINOPHEN 2 TABLET: 5; 325 TABLET ORAL at 21:37

## 2017-01-01 RX ADMIN — OXYCODONE HYDROCHLORIDE AND ACETAMINOPHEN 2 TABLET: 5; 325 TABLET ORAL at 10:36

## 2017-01-01 RX ADMIN — LEVOFLOXACIN 500 MG: 5 INJECTION, SOLUTION INTRAVENOUS at 15:28

## 2017-01-01 RX ADMIN — CISPLATIN 70 MG: 100 INJECTION, SOLUTION INTRAVENOUS at 15:38

## 2017-01-01 RX ADMIN — ONDANSETRON 4 MG: 4 TABLET, ORALLY DISINTEGRATING ORAL at 14:41

## 2017-01-01 RX ADMIN — PHENAZOPYRIDINE HYDROCHLORIDE 100 MG: 100 TABLET ORAL at 09:25

## 2017-01-01 RX ADMIN — Medication 500 UNITS: at 09:15

## 2017-01-01 RX ADMIN — SODIUM CHLORIDE 2000 ML: 900 INJECTION, SOLUTION INTRAVENOUS at 12:58

## 2017-01-01 RX ADMIN — FENTANYL CITRATE 25 MCG: 50 INJECTION, SOLUTION INTRAMUSCULAR; INTRAVENOUS at 14:25

## 2017-01-01 RX ADMIN — Medication 10 ML: at 17:15

## 2017-01-01 RX ADMIN — DEXTROSE MONOHYDRATE: 5 INJECTION, SOLUTION INTRAVENOUS at 17:05

## 2017-01-01 RX ADMIN — VENLAFAXINE 75 MG: 37.5 TABLET ORAL at 09:30

## 2017-01-01 RX ADMIN — OXYCODONE HYDROCHLORIDE 10 MG: 100 SOLUTION ORAL at 20:14

## 2017-01-01 RX ADMIN — FUROSEMIDE 20 MG: 10 INJECTION, SOLUTION INTRAMUSCULAR; INTRAVENOUS at 07:41

## 2017-01-01 RX ADMIN — SODIUM CHLORIDE, SODIUM LACTATE, POTASSIUM CHLORIDE, AND CALCIUM CHLORIDE 125 ML/HR: 600; 310; 30; 20 INJECTION, SOLUTION INTRAVENOUS at 00:23

## 2017-01-01 RX ADMIN — POTASSIUM CHLORIDE 40 MEQ: 750 TABLET, FILM COATED, EXTENDED RELEASE ORAL at 07:42

## 2017-01-01 RX ADMIN — LORAZEPAM 1 MG: 2 INJECTION INTRAMUSCULAR; INTRAVENOUS at 03:54

## 2017-01-01 RX ADMIN — LIDOCAINE HYDROCHLORIDE 200 MG: 20 INJECTION, SOLUTION INFILTRATION; PERINEURAL at 14:55

## 2017-01-01 RX ADMIN — KETOROLAC TROMETHAMINE 30 MG: 30 INJECTION, SOLUTION INTRAMUSCULAR; INTRAVENOUS at 13:33

## 2017-01-01 RX ADMIN — SODIUM CHLORIDE 25 ML/HR: 900 INJECTION, SOLUTION INTRAVENOUS at 11:40

## 2017-01-01 RX ADMIN — OXYCODONE HYDROCHLORIDE AND ACETAMINOPHEN 2 TABLET: 5; 325 TABLET ORAL at 07:25

## 2017-01-01 RX ADMIN — VENLAFAXINE 75 MG: 37.5 TABLET ORAL at 09:08

## 2017-01-01 RX ADMIN — ACETAMINOPHEN 650 MG: 325 TABLET ORAL at 12:35

## 2017-01-01 RX ADMIN — ONDANSETRON 4 MG: 2 INJECTION INTRAMUSCULAR; INTRAVENOUS at 13:33

## 2017-01-01 RX ADMIN — LORAZEPAM 2 MG: 2 SOLUTION, CONCENTRATE ORAL at 18:56

## 2017-01-01 RX ADMIN — POTASSIUM CHLORIDE: 2 INJECTION, SOLUTION, CONCENTRATE INTRAVENOUS at 14:30

## 2017-01-01 RX ADMIN — SODIUM CHLORIDE: 450 INJECTION, SOLUTION INTRAVENOUS at 20:48

## 2017-01-01 RX ADMIN — LORAZEPAM 1 MG: 2 INJECTION INTRAMUSCULAR; INTRAVENOUS at 22:00

## 2017-01-01 RX ADMIN — HEPARIN SODIUM 3000 UNITS: 1000 INJECTION, SOLUTION INTRAVENOUS; SUBCUTANEOUS at 14:30

## 2017-01-01 RX ADMIN — POTASSIUM CHLORIDE: 2 INJECTION, SOLUTION, CONCENTRATE INTRAVENOUS at 13:46

## 2017-01-01 RX ADMIN — Medication 10 ML: at 16:40

## 2017-01-01 RX ADMIN — SODIUM CHLORIDE 25 ML/HR: 900 INJECTION, SOLUTION INTRAVENOUS at 09:36

## 2017-01-01 RX ADMIN — OXYCODONE HYDROCHLORIDE AND ACETAMINOPHEN 2 TABLET: 5; 325 TABLET ORAL at 06:30

## 2017-01-01 RX ADMIN — Medication 500 UNITS: at 18:20

## 2017-01-01 RX ADMIN — SODIUM CHLORIDE 10 ML: 9 INJECTION, SOLUTION INTRAMUSCULAR; INTRAVENOUS; SUBCUTANEOUS at 14:11

## 2017-01-01 RX ADMIN — METOCLOPRAMIDE 10 MG: 5 INJECTION, SOLUTION INTRAMUSCULAR; INTRAVENOUS at 20:59

## 2017-01-01 RX ADMIN — OXYBUTYNIN CHLORIDE 5 MG: 5 TABLET, EXTENDED RELEASE ORAL at 18:57

## 2017-01-01 RX ADMIN — SUCCINYLCHOLINE CHLORIDE 200 MG: 20 INJECTION INTRAMUSCULAR; INTRAVENOUS at 12:41

## 2017-01-01 RX ADMIN — SODIUM CHLORIDE 25 ML/HR: 900 INJECTION, SOLUTION INTRAVENOUS at 16:38

## 2017-01-01 RX ADMIN — CEFAZOLIN 2 G: 1 INJECTION, POWDER, FOR SOLUTION INTRAMUSCULAR; INTRAVENOUS; PARENTERAL at 12:48

## 2017-01-01 RX ADMIN — LORAZEPAM 1 MG: 2 INJECTION INTRAMUSCULAR; INTRAVENOUS at 00:27

## 2017-01-01 RX ADMIN — DEXAMETHASONE SODIUM PHOSPHATE 12 MG: 4 INJECTION, SOLUTION INTRA-ARTICULAR; INTRALESIONAL; INTRAMUSCULAR; INTRAVENOUS; SOFT TISSUE at 13:47

## 2017-01-01 RX ADMIN — FENTANYL CITRATE 25 MCG: 50 INJECTION, SOLUTION INTRAMUSCULAR; INTRAVENOUS at 14:27

## 2017-01-01 RX ADMIN — SODIUM CHLORIDE 150 MG: 900 INJECTION, SOLUTION INTRAVENOUS at 14:25

## 2017-01-01 RX ADMIN — SODIUM CHLORIDE: 450 INJECTION, SOLUTION INTRAVENOUS at 09:04

## 2017-01-01 RX ADMIN — Medication 500 UNITS: at 16:41

## 2017-01-01 RX ADMIN — METOCLOPRAMIDE 10 MG: 5 INJECTION, SOLUTION INTRAMUSCULAR; INTRAVENOUS at 02:58

## 2017-01-01 RX ADMIN — CISPLATIN 70 MG: 100 INJECTION, SOLUTION INTRAVENOUS at 14:52

## 2017-01-01 RX ADMIN — SODIUM CHLORIDE 150 MG: 900 INJECTION, SOLUTION INTRAVENOUS at 13:51

## 2017-01-01 RX ADMIN — MIDAZOLAM HYDROCHLORIDE 0.5 MG: 1 INJECTION, SOLUTION INTRAMUSCULAR; INTRAVENOUS at 14:32

## 2017-01-01 RX ADMIN — DEXAMETHASONE SODIUM PHOSPHATE 8 MG: 4 INJECTION, SOLUTION INTRA-ARTICULAR; INTRALESIONAL; INTRAMUSCULAR; INTRAVENOUS; SOFT TISSUE at 12:48

## 2017-01-01 RX ADMIN — FENTANYL CITRATE 50 MCG: 50 INJECTION, SOLUTION INTRAMUSCULAR; INTRAVENOUS at 12:41

## 2017-01-01 RX ADMIN — IOPAMIDOL 50 ML: 612 INJECTION, SOLUTION INTRAVENOUS at 14:47

## 2017-01-01 RX ADMIN — SODIUM CHLORIDE 250 ML: 900 INJECTION, SOLUTION INTRAVENOUS at 21:23

## 2017-01-01 RX ADMIN — Medication 10 ML: at 11:36

## 2017-01-01 RX ADMIN — Medication 500 UNITS: at 11:18

## 2017-01-01 RX ADMIN — FENTANYL CITRATE 50 MCG: 50 INJECTION, SOLUTION INTRAMUSCULAR; INTRAVENOUS at 14:48

## 2017-01-01 RX ADMIN — MIDAZOLAM HYDROCHLORIDE 0.5 MG: 1 INJECTION, SOLUTION INTRAMUSCULAR; INTRAVENOUS at 14:50

## 2017-01-01 RX ADMIN — SODIUM CHLORIDE 1000 ML/HR: 900 INJECTION, SOLUTION INTRAVENOUS at 09:16

## 2017-01-01 RX ADMIN — MIDAZOLAM HYDROCHLORIDE 1 MG: 1 INJECTION, SOLUTION INTRAMUSCULAR; INTRAVENOUS at 14:25

## 2017-01-01 RX ADMIN — OXYCODONE HYDROCHLORIDE AND ACETAMINOPHEN 2 TABLET: 5; 325 TABLET ORAL at 07:31

## 2017-01-01 RX ADMIN — OXYCODONE HYDROCHLORIDE AND ACETAMINOPHEN 1 TABLET: 5; 325 TABLET ORAL at 15:25

## 2017-01-01 RX ADMIN — OXYCODONE HYDROCHLORIDE AND ACETAMINOPHEN 2 TABLET: 5; 325 TABLET ORAL at 16:35

## 2017-01-01 RX ADMIN — MAGNESIUM SULFATE HEPTAHYDRATE 2 G: 40 INJECTION, SOLUTION INTRAVENOUS at 10:56

## 2017-01-01 RX ADMIN — PHENAZOPYRIDINE HYDROCHLORIDE 100 MG: 100 TABLET ORAL at 13:00

## 2017-01-01 RX ADMIN — OXYBUTYNIN CHLORIDE 5 MG: 5 TABLET, EXTENDED RELEASE ORAL at 09:25

## 2017-01-01 RX ADMIN — SODIUM BICARBONATE 2 ML: 0.2 INJECTION, SOLUTION INTRAVENOUS at 17:06

## 2017-01-01 RX ADMIN — SODIUM CHLORIDE 10 MG: 9 INJECTION INTRAMUSCULAR; INTRAVENOUS; SUBCUTANEOUS at 09:39

## 2017-01-01 RX ADMIN — OXYCODONE HYDROCHLORIDE AND ACETAMINOPHEN 2 TABLET: 5; 325 TABLET ORAL at 20:59

## 2017-01-01 RX ADMIN — LORAZEPAM 1 MG: 2 INJECTION INTRAMUSCULAR; INTRAVENOUS at 22:24

## 2017-01-01 RX ADMIN — LORAZEPAM 2 MG: 2 SOLUTION, CONCENTRATE ORAL at 20:14

## 2017-01-01 RX ADMIN — LORAZEPAM 1 MG: 2 INJECTION INTRAMUSCULAR; INTRAVENOUS at 15:35

## 2017-01-01 RX ADMIN — SODIUM CHLORIDE, SODIUM LACTATE, POTASSIUM CHLORIDE, AND CALCIUM CHLORIDE 125 ML/HR: 600; 310; 30; 20 INJECTION, SOLUTION INTRAVENOUS at 06:26

## 2017-01-01 RX ADMIN — LIDOCAINE HYDROCHLORIDE 0.1 ML: 10 INJECTION, SOLUTION EPIDURAL; INFILTRATION; INTRACAUDAL; PERINEURAL at 11:29

## 2017-01-01 RX ADMIN — Medication 10 ML: at 11:15

## 2017-01-01 RX ADMIN — MAGNESIUM SULFATE: 500 INJECTION, SOLUTION INTRAMUSCULAR; INTRAVENOUS at 13:08

## 2017-01-01 RX ADMIN — Medication 10 ML: at 16:41

## 2017-01-01 RX ADMIN — PHENAZOPYRIDINE HYDROCHLORIDE 100 MG: 100 TABLET ORAL at 22:00

## 2017-01-01 RX ADMIN — FENTANYL CITRATE 50 MCG: 50 INJECTION, SOLUTION INTRAMUSCULAR; INTRAVENOUS at 14:11

## 2017-01-01 RX ADMIN — DEXAMETHASONE SODIUM PHOSPHATE 12 MG: 4 INJECTION, SOLUTION INTRA-ARTICULAR; INTRALESIONAL; INTRAMUSCULAR; INTRAVENOUS; SOFT TISSUE at 15:07

## 2017-01-01 RX ADMIN — FENTANYL CITRATE 50 MCG: 50 INJECTION, SOLUTION INTRAMUSCULAR; INTRAVENOUS at 14:49

## 2017-01-01 RX ADMIN — MIDAZOLAM HYDROCHLORIDE 1 MG: 1 INJECTION, SOLUTION INTRAMUSCULAR; INTRAVENOUS at 14:21

## 2017-01-01 RX ADMIN — MIDAZOLAM HYDROCHLORIDE 2 MG: 1 INJECTION, SOLUTION INTRAMUSCULAR; INTRAVENOUS at 12:37

## 2017-01-01 RX ADMIN — FENTANYL CITRATE 50 MCG: 50 INJECTION, SOLUTION INTRAMUSCULAR; INTRAVENOUS at 13:11

## 2017-01-01 RX ADMIN — SODIUM CHLORIDE, SODIUM LACTATE, POTASSIUM CHLORIDE, CALCIUM CHLORIDE: 600; 310; 30; 20 INJECTION, SOLUTION INTRAVENOUS at 12:37

## 2017-01-01 RX ADMIN — METHADONE HYDROCHLORIDE 5 MG: 10 CONCENTRATE ORAL at 20:15

## 2017-01-01 RX ADMIN — Medication 20 ML: at 09:15

## 2017-01-01 RX ADMIN — Medication 20 ML: at 08:34

## 2017-01-01 RX ADMIN — OXYCODONE HYDROCHLORIDE AND ACETAMINOPHEN 1 TABLET: 5; 325 TABLET ORAL at 14:50

## 2017-01-01 RX ADMIN — Medication 20 ML: at 09:35

## 2017-01-05 PROBLEM — K59.00 CONSTIPATION: Status: ACTIVE | Noted: 2017-01-01

## 2017-01-05 NOTE — PROGRESS NOTES
Outpatient Infusion Center - Chemotherapy Progress Note    0815 Pt admit to [facility] for cycle 5 cisplatin and hyrdration HELD ambulatory in stable condition. Assessment completed. PICC right arm with positive blood return from purple port, red port flushed but no blood return. Labs drawn and sent. Yodit Mcconnell NP called about patient concerns. Patient has bladder infection (per patient), started antibiotic therapy, patient not passing gas or having regular bowel movements, pain when urinating. New order for one liter of NS, Wall NP in with patient to discuss concerns. Holding treatment today due to lab values. Additional order for one liter of NS. PICC dressing change per protocol. Zuleyka Wisdom spoke with patient in [facility] today. Chemotherapy Flowsheet 1/5/2017   Cycle C5   Date 1/5/2017   Drug / Regimen cisplatin   Dosage -   Pre Hydration -   Post Hydration -   Notes HELD     Visit Vitals    /77 (BP 1 Location: Left arm, BP Patient Position: Sitting)    Pulse 91    Temp 100.1 °F (37.8 °C)    Resp 18    Ht 5' 6.5\" (1.689 m)    Wt 145.2 kg (320 lb)    LMP 11/04/2016    SpO2 95%    BMI 50.88 kg/m2       Medications:  Normal saline two liters  Heparin flush    1145 Pt tolerated treatment well. PICC right arm maintained positive blood return throughout treatment. Flushed, heparinized and curos caps placed. D/c home ambulatory in no distress. Pt aware of next appointment scheduled for 1/12/17 at 1100.     Recent Results (from the past 12 hour(s))   CBC WITH AUTOMATED DIFF    Collection Time: 01/05/17  8:35 AM   Result Value Ref Range    WBC 13.4 (H) 3.6 - 11.0 K/uL    RBC 3.74 (L) 3.80 - 5.20 M/uL    HGB 9.1 (L) 11.5 - 16.0 g/dL    HCT 28.5 (L) 35.0 - 47.0 %    MCV 76.2 (L) 80.0 - 99.0 FL    MCH 24.3 (L) 26.0 - 34.0 PG    MCHC 31.9 30.0 - 36.5 g/dL    RDW 23.6 (H) 11.5 - 14.5 %    PLATELET 585 688 - 316 K/uL    NEUTROPHILS 82 (H) 32 - 75 %    BANDS 10 (H) 0 - 6 %    LYMPHOCYTES 6 (L) 12 - 49 %    MONOCYTES 2 (L) 5 - 13 %    EOSINOPHILS 0 0 - 7 %    BASOPHILS 0 0 - 1 %    ABS. NEUTROPHILS 12.3 (H) 1.8 - 8.0 K/UL    ABS. LYMPHOCYTES 0.8 0.8 - 3.5 K/UL    ABS. MONOCYTES 0.3 0.0 - 1.0 K/UL    ABS. EOSINOPHILS 0.0 0.0 - 0.4 K/UL    ABS. BASOPHILS 0.0 0.0 - 0.1 K/UL    DF MANUAL      RBC COMMENTS OVALOCYTES  PRESENT        RBC COMMENTS LYDIA CELLS  PRESENT        RBC COMMENTS MICROCYTOSIS  1+        RBC COMMENTS ANISOCYTOSIS  2+        WBC COMMENTS DOHLE BODIES     METABOLIC PANEL, COMPREHENSIVE    Collection Time: 01/05/17  8:35 AM   Result Value Ref Range    Sodium 131 (L) 136 - 145 mmol/L    Potassium 4.5 3.5 - 5.1 mmol/L    Chloride 100 97 - 108 mmol/L    CO2 18 (L) 21 - 32 mmol/L    Anion gap 13 5 - 15 mmol/L    Glucose 86 65 - 100 mg/dL    BUN 42 (H) 6 - 20 MG/DL    Creatinine 3.36 (H) 0.55 - 1.02 MG/DL    BUN/Creatinine ratio 13 12 - 20      GFR est AA 18 (L) >60 ml/min/1.73m2    GFR est non-AA 15 (L) >60 ml/min/1.73m2    Calcium 7.3 (L) 8.5 - 10.1 MG/DL    Bilirubin, total 0.5 0.2 - 1.0 MG/DL    ALT 12 12 - 78 U/L    AST 23 15 - 37 U/L    Alk.  phosphatase 195 (H) 45 - 117 U/L    Protein, total 5.9 (L) 6.4 - 8.2 g/dL    Albumin 2.0 (L) 3.5 - 5.0 g/dL    Globulin 3.9 2.0 - 4.0 g/dL    A-G Ratio 0.5 (L) 1.1 - 2.2     MAGNESIUM    Collection Time: 01/05/17  8:35 AM   Result Value Ref Range    Magnesium 1.6 1.6 - 2.4 mg/dL

## 2017-01-05 NOTE — PROGRESS NOTES
Problem: Patient Education: Go to Education Activity  Goal: Patient/Family Education  Outcome: Progressing Towards Goal  Holding treatment, getting hydration

## 2017-01-05 NOTE — TELEPHONE ENCOUNTER
LCSW made initial contact in [facility] today. Request made by Janeen Sanchez RN, NN. Introduced self and gave a brief overview of [employer] and its services. Pt very groggy but appreciative of visit. She is not , has no children and lives with her 69 yo mother in the [address] [place] [address]. Inquired about work history, pt says she has not worked since her diagnosis. She worked with the [employer] program for [employer] with [employer]. She does miss her work. Inquired about mental health history which pt denied (no anxiety as noted in Dr. Mak Alcantara note shared by pt). Pt states \"I am worried about my mom\". Pt says they are getting along really well but worries that she is the sole caregiver (pt reports no siblings) and may \"crack\". LCSW shared that meditation, counseling and massage could be available to her mom as well if appropriate. LCSW gave pt a card with direct number and Patient Advocate/ number to schedule an appt. LCSW mentioned if necessary we could shift to a different day to accomodate how she is feeling post chemo/radiation. Will continue to monitor and over support as needed.     Derek Conde LCSW
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
## 2017-01-05 NOTE — PROGRESS NOTES
ONCOLOGY NURSE NAVIGATOR    Supportive visit with Beni Brown in [facility]  She had left VM for me between Christmas and New Year holidays and we have not connected  Can't recall why she was calling me and left no detail on voice mail    Noted that she and mom are both weary from her treatment schedule, care needs and recent hospitalization  She has insight into treatment related SE and kidney injury and management  Beni Brown would like mom to care for self better  Beni Brown admits to having difficulty with follow through -- thinks both would benefit from supports in [facility] (counseling, etc) -- but has not called    She is drowsy and uncomfortable sitting in treatment chair; helped her to position with pillow for better comfort  Feels dry and takes frequent sips  Not eating much or consistently as she feels full and is constipated; last BM was 12/31. Has had RTSE diarrhea but not now. Says she is trying to follow provider recommendations about how to treat/prevent constipation but feels she's getting differing direction from [facility], [address] and [address] she always has difficulty with communicating with 'authority figures', including her physicians, so feels she does a poor job of asking for clarification if she is confused or overwhelmed  Has a good rapport with Isra Good NP  Earle Pemberton will give her written direction today about specific bowel regimen  Describes intact skin in radiation field    Chemo held today    Actions/plan:    1. Empathic listening. Will have Anthonette Headings call Beni Brown to offer appt times with counselor or other supports  2. Requested Sarah Diaz LCSW, introduce self to Beni rBown today in [facility] (done)  3. Reinforced need for bowel regimen  4. General discussion about nutritional strategies:  Small, frequent meals, etc.  Add supplements like Ensure, Boost as tolerated  5. Discussed with Isra Good NP  -- recommend giving Beni Brown consistent directions and in writing when possible.     6.  Suggested Beni Brown might keep log or diary to record symptoms as well as direction she's received but she's not enthusiastic about her ability to attend to this and follow through    Will follow    Yelena Mendez

## 2017-01-12 NOTE — PROGRESS NOTES
hospitals Progress Note    Date: 2017    Name: Dominic Marquez    MRN: [id number]         : 1975    Ms. Bess Arrived ambulatory and in no distress for cycle 7 day 1 of Cisplatin concurrent with radiation regimen. Assessment was completed, no acute issues at this time, no new complaints voiced. Patient arrive with Right upper arm DL PICC in place, flushed without difficulty, labs drawn and in process. PICC dressing changed per policy, area noted to be PICC and irritated. Education provided and patient verbalizes understanding. Chemotherapy Flowsheet 2017   Cycle C7   Date 2017   Drug / Regimen Cisplatin   Dosage 70   Notes Given       Ms. Bess's vitals were reviewed. Visit Vitals    /83    Pulse 77    Temp 98.3 °F (36.8 °C)    Resp 18    Ht 5' 6.5\" (1.689 m)    Wt 138.3 kg (305 lb)    SpO2 95%    Breastfeeding No    BMI 48.49 kg/m2       Lab results were obtained and reviewed. Per provider, OK to proceed with treatment. Recent Results (from the past 12 hour(s))   CBC WITH AUTOMATED DIFF    Collection Time: 17 11:38 AM   Result Value Ref Range    WBC 3.0 (L) 3.6 - 11.0 K/uL    RBC 3.35 (L) 3.80 - 5.20 M/uL    HGB 8.2 (L) 11.5 - 16.0 g/dL    HCT 26.9 (L) 35.0 - 47.0 %    MCV 80.3 80.0 - 99.0 FL    MCH 24.5 (L) 26.0 - 34.0 PG    MCHC 30.5 30.0 - 36.5 g/dL    RDW 22.2 (H) 11.5 - 14.5 %    PLATELET 518 973 - 235 K/uL    NEUTROPHILS 65 32 - 75 %    BANDS 2 %    LYMPHOCYTES 17 12 - 49 %    MONOCYTES 11 5 - 13 %    EOSINOPHILS 4 0 - 7 %    BASOPHILS 1 0 - 1 %    ABS. NEUTROPHILS 2.1 1.8 - 8.0 K/UL    ABS. LYMPHOCYTES 0.5 (L) 0.8 - 3.5 K/UL    ABS. MONOCYTES 0.3 0.0 - 1.0 K/UL    ABS. EOSINOPHILS 0.1 0.0 - 0.4 K/UL    ABS.  BASOPHILS 0.0 0.0 - 0.1 K/UL    DF MANUAL      RBC COMMENTS ANISOCYTOSIS  2+        RBC COMMENTS MICROCYTOSIS  1+        RBC COMMENTS HYPOCHROMIA  1+        RBC COMMENTS OVALOCYTES  PRESENT        RBC COMMENTS RBC FRAGMENTS  PRESENT METABOLIC PANEL, COMPREHENSIVE    Collection Time: 01/12/17 11:38 AM   Result Value Ref Range    Sodium 139 136 - 145 mmol/L    Potassium 4.9 3.5 - 5.1 mmol/L    Chloride 110 (H) 97 - 108 mmol/L    CO2 20 (L) 21 - 32 mmol/L    Anion gap 9 5 - 15 mmol/L    Glucose 102 (H) 65 - 100 mg/dL    BUN 23 (H) 6 - 20 MG/DL    Creatinine 1.60 (H) 0.55 - 1.02 MG/DL    BUN/Creatinine ratio 14 12 - 20      GFR est AA 43 (L) >60 ml/min/1.73m2    GFR est non-AA 35 (L) >60 ml/min/1.73m2    Calcium 8.0 (L) 8.5 - 10.1 MG/DL    Bilirubin, total 0.3 0.2 - 1.0 MG/DL    ALT 14 12 - 78 U/L    AST 17 15 - 37 U/L    Alk. phosphatase 234 (H) 45 - 117 U/L    Protein, total 6.5 6.4 - 8.2 g/dL    Albumin 2.0 (L) 3.5 - 5.0 g/dL    Globulin 4.5 (H) 2.0 - 4.0 g/dL    A-G Ratio 0.4 (L) 1.1 - 2.2     MAGNESIUM    Collection Time: 01/12/17 11:38 AM   Result Value Ref Range    Magnesium 1.9 1.6 - 2.4 mg/dL       Pre-medications  And hydration were administered as ordered and chemotherapy was infused without complications. Patient monitored, no adverse reaction noted. Blood return noted pre and post treatment, both lumens of PICC flushed per policy and wrapped, in place for next treatment. Medications received:  NS IV  Kytril IV  Decadron IV  Emend IV  Hydration pre and post   Cisplatin IV  Patient Vitals for the past 12 hrs:   Temp Pulse Resp BP SpO2   01/12/17 1640 98.3 °F (36.8 °C) 77 18 133/83 -   01/12/17 1105 97.8 °F (36.6 °C) 70 18 130/70 95 %     Ms. Bess tolerated treatment well and was discharged from [place] [id number] in stable condition at 1645. Discharge instructions given and patient verbalizes understanding. She is to return on January 19 at 11 am for her next appointment.     Samia Hernandez RN  January 12, 2017

## 2017-01-16 NOTE — IP AVS SNAPSHOT
[address] 
[phone] Patient: Sarah Ndiaye MRN: [id number] :1975 You are allergic to the following Allergen Reactions Coumadin (Warfarin) Other (comments) SEVERE ABDOMINAL CRAMPING Recent Documentation Height Weight Breastfeeding? BMI OB Status Smoking Status 1.689 m 142.4 kg No 49.92 kg/m2 Chemically Induced Former Smoker Emergency Contacts Name Discharge Info Relation Home Work Mobile Tyrell Gold CAREGIVER [3] Mother [14] [phone] About your hospitalization You were admitted on:  2017 You last received care in the:  [facility] PACU You were discharged on:  2017 Unit phone number:  [phone] Why you were hospitalized Your primary diagnosis was:  Not on File Providers Seen During Your Hospitalizations Provider Role Specialty Primary office phone Jass Agrawal MD Attending Provider Gynecologic Oncology [phone] Your Primary Care Physician (PCP) Primary Care Physician Office Phone Office Fax Beni Sylvester [phone] Follow-up Information Follow up With Details Comments Contact Info Kvng Brooks III, MD   [address]  [address] [facility] [address] 
[phone] Jass Agrawal MD Schedule an appointment as soon as possible for a visit in 1 month(s)  [address] 
[phone] Your Appointments  11:00 AM EST INFUSION 240 RI with RM 102A- CHAIR ADRIANA [facility] ([address]) [address] [address] [phone] Go to [facility], ground floor.   
  
    
  
  
 
  
  
  
Current Discharge Medication List  
  
 CONTINUE these medications which have NOT CHANGED Dose & Instructions Dispensing Information Comments Morning Noon Evening Bedtime  
 atenolol 25 mg tablet Commonly known as:  TENORMIN Your next dose is: Today, Tomorrow Other:  _________ TK 1 T PO QD Refills:  0  
     
   
   
   
  
 dextroamphetamine-amphetamine 20 mg tablet Commonly known as:  ADDERALL Your next dose is: Today, Tomorrow Other:  _________ TK 1 T PO BID Refills:  0  
     
   
   
   
  
 diphenoxylate-atropine 2.5-0.025 mg per tablet Commonly known as:  LOMOTIL Your next dose is: Today, Tomorrow Other:  _________ Dose:  1 Tab Take 1 Tab by mouth four (4) times daily as needed for Diarrhea. Max Daily Amount: 4 Tabs. Quantity:  30 Tab Refills:  1  
     
   
   
   
  
 ibuprofen 200 mg tablet Commonly known as:  MOTRIN Your next dose is: Today, Tomorrow Other:  _________ Dose:  600 mg Take 600 mg by mouth every six (6) hours as needed for Pain. Refills:  0 LORazepam 2 mg tablet Commonly known as:  ATIVAN Your next dose is: Today, Tomorrow Other:  _________ TK 1 T PO BID PRN Refills:  2 MUCINEX -30 mg per tablet Generic drug:  guaiFENesin-dextromethorphan Your next dose is: Today, Tomorrow Other:  _________ Dose:  1 Tab Take 1 Tab by mouth two (2) times a day. Refills:  0  
     
   
   
   
  
 ondansetron hcl 4 mg tablet Commonly known as:  Norma Loss Your next dose is: Today, Tomorrow Other:  _________ Dose:  4 mg Take 1 Tab by mouth every eight (8) hours as needed for Nausea. Quantity:  30 Tab Refills:  4  
     
   
   
   
  
 oxyCODONE-acetaminophen 5-325 mg per tablet Commonly known as:  PERCOCET Your next dose is: Today, Tomorrow Other:  _________ Dose:  1 Tab Take 1 Tab by mouth every four (4) hours as needed for Pain. Max Daily Amount: 6 Tabs. Quantity:  30 Tab Refills:  0  
     
   
   
   
  
 traMADol 50 mg tablet Commonly known as:  ULTRAM  
   
Your next dose is: Today, Tomorrow Other:  _________ Dose:  50 mg Take 1 Tab by mouth every six (6) hours as needed for Pain. Max Daily Amount: 200 mg. Quantity:  30 Tab Refills:  0  
     
   
   
   
  
 venlafaxine 75 mg tablet Commonly known as:  [facility] Your next dose is: Today, Tomorrow Other:  _________ TK 1 T PO BID FOR DEPRESSION Refills:  8 Where to Get Your Medications Information on where to get these meds will be given to you by the nurse or doctor. ! Ask your nurse or doctor about these medications  
  oxyCODONE-acetaminophen 5-325 mg per tablet Discharge Instructions   
  
______________________________________________________________________ Anesthesia Discharge Instructions After general anesthesia or intervenous sedation, for 24 hours or while taking prescription Narcotics: · Limit your activities · Do not drive or operate hazardous machinery · If you have not urinated within 8 hours after discharge, please contact your surgeon on call. · Do not make important personal or business decisions · Do not drink alcoholic beverages Report the following to your surgeon: 
· Excessive pain, swelling, redness or odor of or around the surgical area · Temperature over 100.5 degrees · Nausea and vomiting lasting longer than 4 hours or if unable to take medication · Any signs of decreased circulation or nerve impairment to extremity:  Change in color, persistent numbness, tingling, coldness or increased pain. · Any questions Call MD for any problems, 
Call MD for increased vaginal bleeding Discharge Orders None Introducing [facility] SERVICES! Dear Adrian Pillai: Thank you for requesting a [employer] account. Our records indicate that you already have an active [employer] account. You can access your account anytime at [email or web address] Did you know that you can access your hospital and ER discharge instructions at any time in [employer]? You can also review all of your test results from your hospital stay or ER visit. Additional Information If you have questions, please visit the Frequently Asked Questions section of the [employer] website at [email or web address] Remember, [employer] is NOT to be used for urgent needs. For medical emergencies, dial 911. Now available from your iPhone and Android! General Information Please provide this summary of care documentation to your next provider. Patient Signature:  ____________________________________________________________ Date:  ____________________________________________________________  
  
Migel Villeda Provider Signature:  ____________________________________________________________ Date:  ____________________________________________________________

## 2017-01-16 NOTE — DISCHARGE INSTRUCTIONS
______________________________________________________________________    Anesthesia Discharge Instructions    After general anesthesia or intervenous sedation, for 24 hours or while taking prescription Narcotics:  · Limit your activities  · Do not drive or operate hazardous machinery  · If you have not urinated within 8 hours after discharge, please contact your surgeon on call. · Do not make important personal or business decisions  · Do not drink alcoholic beverages    Report the following to your surgeon:  · Excessive pain, swelling, redness or odor of or around the surgical area  · Temperature over 100.5 degrees  · Nausea and vomiting lasting longer than 4 hours or if unable to take medication  · Any signs of decreased circulation or nerve impairment to extremity:  Change in color, persistent numbness, tingling, coldness or increased pain.   · Any questions      PELVIC REST (nothing in Vagina until ok with Dr Becca Barr, no tampons, no duching, no tub-baths, no swimming)  Call MD for any problems,  Call MD for increased vaginal bleeding
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
## 2017-01-16 NOTE — ROUTINE PROCESS
Patient: Lawrence Moore MRN: [id number]  SSN: [id number]   YOB: 1975  Age: 35 y.o. Sex: female     Patient is status post Procedure(s):  CERVICAL SLEEVE PLACEMENT.     Surgeon(s) and Role:     * Vickie Rosas MD - Primary    Local/Dose/Irrigation: nacl            PICC Double Lumen 11/25/16 Right (Active)        Venous Access Device (Active)      Peripheral IV 01/16/17 Left Wrist (Active)   Site Assessment Clean, dry, & intact 1/16/2017 11:00 AM   Dressing Status Occlusive 1/16/2017 11:00 AM   Hub Color/Line Status Infusing 1/16/2017 11:00 AM                           Dressing/Packing:  Wound Vagina-DRESSING TYPE: Lucila-pad (01/16/17 1300)  Splint/Cast:  ]    Other:

## 2017-01-16 NOTE — ANESTHESIA POSTPROCEDURE EVALUATION
Post-Anesthesia Evaluation and Assessment    Patient: Ramses Elmore MRN: [id number]  SSN: [id number]    YOB: 1975  Age: 35 y.o. Sex: female       Cardiovascular Function/Vital Signs  Visit Vitals    /81    Pulse (!) 54    Temp 36.2 °C (97.2 °F)    Resp 15    Ht 5' 6.5\" (1.689 m)    Wt 142.4 kg (314 lb)    SpO2 100%    Breastfeeding No    BMI 49.92 kg/m2       Patient is status post general anesthesia for Procedure(s):  CERVICAL SLEEVE PLACEMENT. Nausea/Vomiting: None    Postoperative hydration reviewed and adequate. Pain:  Pain Scale 1: FLACC (01/16/17 1350)  Pain Intensity 1: 0 (01/16/17 1350)   Managed    Neurological Status:   Neuro (WDL): Exceptions to WDL (01/16/17 1350)  Neuro  Neurologic State: Drowsy (01/16/17 1350)  LUE Motor Response: Purposeful (01/16/17 1350)  LLE Motor Response: Purposeful (01/16/17 1350)  RUE Motor Response: Purposeful (01/16/17 1350)  RLE Motor Response: Purposeful (01/16/17 1350)   At baseline    Mental Status and Level of Consciousness: Arousable    Pulmonary Status:   O2 Device: Nasal cannula (01/16/17 1347)   Adequate oxygenation and airway patent    Complications related to anesthesia: None    Post-anesthesia assessment completed.  No concerns    Signed By: Gabrielle Schwarz MD     January 16, 2017

## 2017-01-16 NOTE — OP NOTES
Gynecologic Oncology Operative Report    Nga Pineda  1/16/2017    Pre-operative dx: Adenocarcinoma of the cervix, stage FILOMENA    Post-operative dx: Adenocarcinoma of the cervix, stage FILOMENA    Procedure: 1) Exam under anesthesia    2) Cervical sleeve placement    Surgeon:  Valentin Luna MD    Assistant:  n/a     Anesthesia:  GETA    EBL:  <80 cc    Complications:  None    Specimens:  None    Operative indications:  36 yo WF with stage FILOMENA cervical cancer (bladder involvement). Currently undergoing chemoradiation. Needs cervical sleeve to facilitate brachytherapy. Previously placed sleeve has become dislodged into the vagina. Patient would not tolerate removal in the radiation center. Operative findings:  Excellent response to radiation therapy. Marked regression of previously noted tumor. Previously placed cervical sleeve found dislodged in the vagina. Sutures intact, which had clearly been sown into necrotic tissue previously. Uterus sounded to 7 cm    Procedure in detail:  After the risks, benefits, indications, and alternatives of the procedure were discussed with the patient and informed consent was obtained, the patient was taken to the operating room. She was positively identified, administered general anesthesia, and then placed in the dorsal lithotomy position in [address] [place] [address]. An exam under anesthesia was performed. The above mentioned findings were noted. She was then prepped and draped in the usual fashion. The bladder was drained with a red rubber catheter. A weighted speculum was then placed in the posterior vagina and a right angle retractor was placed in the anterior vagina in order to visualize the cervix. The anterior lip of the cervix was then grasped with a single tooth tenaculum for manipulation. The cervix was cannulated and the uterus was sounded to determine its depth. The cervix was then progressively dilated until it could accommodate the cervical sleeve.   A 0 Proline suture was then used to anchor the sleeve in place at 3 o'clock and 9 o'clock. A 4 cm tag was left on these sutures so that they can later be cut and the sleeve removed in the office. The tenaculum was removed from the anterior lip of the cervix, followed by the vaginal retractors. The patient was taken out of stirrups, awakened from anesthesia, and taken to the recovery room in stable condition. All instrument, sponge, and needle counts were correct.         Jamison Yuen MD  1/16/2017  1:37 PM

## 2017-01-16 NOTE — BRIEF OP NOTE
BRIEF OPERATIVE NOTE    Date of Procedure: 1/16/2017   Preoperative Diagnosis: CERVICAL CANCER  Postoperative Diagnosis: CERVICAL CANCER    Procedure(s): EXAM UNDER ANESTHESIA, CERVICAL SLEEVE PLACEMENT  Surgeon(s) and Role:     * Vickie Rosas MD - Primary  Surgical Staff:  Circ-1: Saul Hernandez RN  Circ-Relief: Flaquito Lucia RN  Scrub Tech-1: Kobe Clark  Event Time In   Incision Start 1312   Incision Close 1333     Anesthesia: General   Estimated Blood Loss: <15 cc  Specimens: * No specimens in log *   Findings: Excellent response to radiation therapy. Marked regression of previously noted tumor. Previously placed cervical sleeve found dislodged in the vagina. Sutures intact, which had clearly been sown into necrotic tissue previously. Uterus sounded to 7 cm. Complications: None  Implants:   Implant Name Type Inv.  Item Serial No.  Lot No. LRB No. Used Action   JOSLYN Sleeve CT-MR     N/A   N/A N/A 1 Implanted       Vickie Rosas MD

## 2017-01-19 NOTE — PROGRESS NOTES
[facility] Out[facility] Infusion [facility] Note:  1100Pt arrived at [facility] ambulatory and in no distress for C7 or 8 but has one more treatment. Assessment stable. Pt not feeling as well. Got 1 internal radiation and is having 2/3 of Friday. Medications received:  *Decadron  Kytril  Emend  Ciplatin  Hydration with additives. 1600 Tolerated treatment well, no adverse reaction noted. D/Cd from [facility] and in no distress accompanied by self. Next appt 1/26 0900  Visit Vitals    /79    Pulse 66    Temp 98.1 °F (36.7 °C)    Resp 18    LMP 11/04/2016     Recent Results (from the past 12 hour(s))   CBC WITH AUTOMATED DIFF    Collection Time: 01/19/17 11:06 AM   Result Value Ref Range    WBC 4.5 3.6 - 11.0 K/uL    RBC 3.25 (L) 3.80 - 5.20 M/uL    HGB 8.0 (L) 11.5 - 16.0 g/dL    HCT 26.1 (L) 35.0 - 47.0 %    MCV 80.3 80.0 - 99.0 FL    MCH 24.6 (L) 26.0 - 34.0 PG    MCHC 30.7 30.0 - 36.5 g/dL    RDW 20.6 (H) 11.5 - 14.5 %    PLATELET 627 003 - 613 K/uL    NEUTROPHILS 76 (H) 32 - 75 %    LYMPHOCYTES 11 (L) 12 - 49 %    MONOCYTES 10 5 - 13 %    EOSINOPHILS 3 0 - 7 %    BASOPHILS 0 0 - 1 %    ABS. NEUTROPHILS 3.4 1.8 - 8.0 K/UL    ABS. LYMPHOCYTES 0.5 (L) 0.8 - 3.5 K/UL    ABS. MONOCYTES 0.5 0.0 - 1.0 K/UL    ABS. EOSINOPHILS 0.1 0.0 - 0.4 K/UL    ABS.  BASOPHILS 0.0 0.0 - 0.1 K/UL    DF SMEAR SCANNED      RBC COMMENTS ANISOCYTOSIS  2+        RBC COMMENTS OVALOCYTES  PRESENT        RBC COMMENTS HYPOCHROMIA  1+        RBC COMMENTS MICROCYTOSIS  1+        RBC COMMENTS ACANTHOCYTES  SCHISTOCYTES  PRESENT       METABOLIC PANEL, COMPREHENSIVE    Collection Time: 01/19/17 11:06 AM   Result Value Ref Range    Sodium 136 136 - 145 mmol/L    Potassium 4.4 3.5 - 5.1 mmol/L    Chloride 106 97 - 108 mmol/L    CO2 24 21 - 32 mmol/L    Anion gap 6 5 - 15 mmol/L    Glucose 83 65 - 100 mg/dL    BUN 13 6 - 20 MG/DL    Creatinine 1.19 (H) 0.55 - 1.02 MG/DL    BUN/Creatinine ratio 11 (L) 12 - 20      GFR est AA >60 >60 ml/min/1.73m2    GFR est non-AA 50 (L) >60 ml/min/1.73m2    Calcium 7.9 (L) 8.5 - 10.1 MG/DL    Bilirubin, total 0.4 0.2 - 1.0 MG/DL    ALT 21 12 - 78 U/L    AST 25 15 - 37 U/L    Alk.  phosphatase 194 (H) 45 - 117 U/L    Protein, total 5.9 (L) 6.4 - 8.2 g/dL    Albumin 2.5 (L) 3.5 - 5.0 g/dL    Globulin 3.4 2.0 - 4.0 g/dL    A-G Ratio 0.7 (L) 1.1 - 2.2     MAGNESIUM    Collection Time: 01/19/17 11:06 AM   Result Value Ref Range    Magnesium 1.5 (L) 1.6 - 2.4 mg/dL

## 2017-01-19 NOTE — PROGRESS NOTES
ONCOLOGY NURSE NAVIGATOR    Tano Tobar was sleeping soundly in [facility]  Opted not to disturb her  Aim to visit at next [facility] appt     [address]

## 2017-01-24 NOTE — TELEPHONE ENCOUNTER
Spoke with Shira Carl and her mother Nancy Sampson), and they agreed to she can come to [address] [place] [address] 1/25/17 at 9:00 am for labs ([facility] and [facility]) so she can receive 2 units prbc on 1/26/17.
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
## 2017-01-26 NOTE — TELEPHONE ENCOUNTER
ONCOLOGY NURSE NAVIGATOR    Phoned Nafisa Escalona  Have gathered some FA resources for Renita Lacy to review  Nafisa Escalona will meet with me tomorrow in [facility] while Renita Lacy rec's RT    I'll visit with Renita Lacy in [place] today and will apprise her of this plan    Karsten Phillips MS RN AOCNS

## 2017-01-26 NOTE — PROGRESS NOTES
Outpatient Infusion Center - Chemotherapy Progress Note    0962 Pt admit to [facility] for 2 units of PRBCs and Cisplatin Cycle 7 ambulatory in stable condition. Assessment completed. No new concerns voiced. PICC flushed with positive blood return. Labs drawn per order and sent. Line flushed, pt connected to IV fluids at [facility]. 1st unit of blood hung and completed, pt tolerated well. Nivia Garber NP at bedside assessing pt. 2nd unit of blood hung, chemo ordered at this time. Discussed blood transfusion reactions with pt, pt verbalized understanding. IV hydration arrived at [facility]. Ran Pre-hydration in 2nd lumen of PICC while blood was finishing up. After 2nd unit was finished, PICC was flushed. Pt connected to NS @ [facility]. Pre-meds given prior to chemo. Visit Vitals    /78 (BP 1 Location: Left arm, BP Patient Position: At rest)    Pulse 80    Temp 100.1 °F (37.8 °C)    Resp 16    Ht 5' 6.53\" (1.69 m)    Wt 135.6 kg (299 lb)    LMP 11/04/2016    SpO2 97%    BMI 47.49 kg/m2       Nunu Olson informed pt and nurse today would be pt's last day of chemo. Pt to have PICC line removed in radiology later tonight. NP aware. Pt rang the bell in the lobby after today's treatment. Medications:  NS  2 units PRBCS  Pre Hydration  Kytril  Decadron  Emend  Post Hydration  Cisplatin      1645 Pt tolerated treatment well. PICC maintained positive blood return throughout treatment, flushed with positive blood return at conclusion and new Curos caps applied to end claves. D/c home ambulatory in no distress.      Recent Results (from the past 12 hour(s))   CBC WITH AUTOMATED DIFF    Collection Time: 01/26/17  9:28 AM   Result Value Ref Range    WBC 4.8 3.6 - 11.0 K/uL    RBC 3.09 (L) 3.80 - 5.20 M/uL    HGB 7.6 (L) 11.5 - 16.0 g/dL    HCT 24.7 (L) 35.0 - 47.0 %    MCV 79.9 (L) 80.0 - 99.0 FL    MCH 24.6 (L) 26.0 - 34.0 PG    MCHC 30.8 30.0 - 36.5 g/dL    RDW 21.5 (H) 11.5 - 14.5 %    PLATELET 399 880 - 256 K/uL NEUTROPHILS 76 (H) 32 - 75 %    LYMPHOCYTES 12 12 - 49 %    MONOCYTES 11 5 - 13 %    EOSINOPHILS 1 0 - 7 %    BASOPHILS 0 0 - 1 %    ABS. NEUTROPHILS 3.7 1.8 - 8.0 K/UL    ABS. LYMPHOCYTES 0.6 (L) 0.8 - 3.5 K/UL    ABS. MONOCYTES 0.5 0.0 - 1.0 K/UL    ABS. EOSINOPHILS 0.0 0.0 - 0.4 K/UL    ABS. BASOPHILS 0.0 0.0 - 0.1 K/UL    DF SMEAR SCANNED      RBC COMMENTS ANISOCYTOSIS  2+        RBC COMMENTS OVALOCYTES  PRESENT        RBC COMMENTS MICROCYTOSIS  1+       METABOLIC PANEL, COMPREHENSIVE    Collection Time: 01/26/17  9:28 AM   Result Value Ref Range    Sodium 135 (L) 136 - 145 mmol/L    Potassium 4.0 3.5 - 5.1 mmol/L    Chloride 103 97 - 108 mmol/L    CO2 25 21 - 32 mmol/L    Anion gap 7 5 - 15 mmol/L    Glucose 132 (H) 65 - 100 mg/dL    BUN 14 6 - 20 MG/DL    Creatinine 1.55 (H) 0.55 - 1.02 MG/DL    BUN/Creatinine ratio 9 (L) 12 - 20      GFR est AA 44 (L) >60 ml/min/1.73m2    GFR est non-AA 36 (L) >60 ml/min/1.73m2    Calcium 7.8 (L) 8.5 - 10.1 MG/DL    Bilirubin, total 0.4 0.2 - 1.0 MG/DL    ALT 15 12 - 78 U/L    AST 15 15 - 37 U/L    Alk.  phosphatase 198 (H) 45 - 117 U/L    Protein, total 6.6 6.4 - 8.2 g/dL    Albumin 2.6 (L) 3.5 - 5.0 g/dL    Globulin 4.0 2.0 - 4.0 g/dL    A-G Ratio 0.7 (L) 1.1 - 2.2     MAGNESIUM    Collection Time: 01/26/17  9:28 AM   Result Value Ref Range    Magnesium 1.6 1.6 - 2.4 mg/dL

## 2017-01-27 NOTE — PROGRESS NOTES
ONCOLOGY NURSE NAVIGATOR    Lengthy meeting with Courtney's mother, Tl Seaman, in [facility]  Decision made to meet in person as it's been difficult to understand all concerns via phone  [place] has mostly been too sleepy in [employer] when I've tried to visit  Both mom and Jhonathan sanders have requested navigator support    [place] has last RT treatment today  Completed chemo yesterday  She has follow up appt with Dr. Jared Valdes on 2/7    We discussed the following concerns:    1. Financial  [place] has completed care card application after meeting with financial counselor, Red Holden  Will submit jose by mail  Seeking other sources of financial support  [place] is on leave from her [employer] job  Her stipend is approx $1100/month but she's been unable to work for several months; [employer] health insurance  Has very high co pays and deductible is around $3000  Tl Seaman has paid some medical bills OOP for [place] hopes to return to work following appt with Dr. Jared Valdes on 2/7  Fear that [place] will not be insured if she cannot return to work  Actions:  Enc to submit care card jose ASAP  Gave info about [employer]. com  Gave apps/contact info for [employer] for co-pays  Gave info for [employer]  [employer] and [employer] of  PCP at request as Courtney's PCP is near half-way and she desires to receive care within [facility]    2.   Anxiety/stress  Tl Seaman and Jhonathan sanders have dx ADD   Nazareth Urszula takes Rx under direction of a psychiatrist  [place] [facility]; has been referred to psychiatrist but not established; current ADD meds are prescribed by her PCP  Stress of dx and treatment have heightened their ADD symptoms according to Tl Code  Difficulty attending to and completing task at hand, organizing thoughts, incomplete follow through on required actions  Many questions about Courtney's dx and what to expect following treatment  Living together is source of mutual support as well as source of stress for both  Actions:  Enc continuing counseling/psych care  Enc making list of questions for Dr. Jaquelin Nava materials:  [employer] publication \"Understanding Cervical Cancer,\" and NCI \"Questions to ask your doctor\"  Offered integrative supports in [address] and [employer]:  Yoga, art and music therapies, massage, counseling, relaxation and meditation therapy, support groups  Enc care for caregiver:  Cam Hastings publication \"Caring for loved one with advanced cancer\"    Stated appreciation for the meeting; Candice Womack knows how to reach me  She's in agreement to share this info with Shannan Castano MS RN AOCNS

## 2017-02-02 NOTE — PROGRESS NOTES
Problem: Knowledge Deficit  Goal: *Verbalizes understanding of procedures and medications  Outcome: Progressing Towards Goal  Fever

## 2017-02-02 NOTE — PROGRESS NOTES
Outpatient Infusion Center Short Visit Progress Note    1700 Pt admit to [facility] for picc line removal. Pt ambulatory in stable condition. Assessment completed. No new concerns voiced. Pt is running a low grade fever. Contacted doc on call (Dr. Calin Dobbs) who advised me to tell pt to take some Tylenol and monitor temperature at home. Pt is to contact Dr. Calin Dobbs if fever consists. Pt verbalized understanding. Visit Vitals    /82 (BP 1 Location: Left arm, BP Patient Position: Sitting)    Pulse 79    Temp (!) 101.4 °F (38.6 °C)    Resp 16    LMP 11/04/2016    Breastfeeding No       PICC line flushes with positive blood return. Line removed with 42 cm catheter noted. No bleeding noted. Stitch cut and removed per protocol. Gauze, bacitracin, and coban applied. [id number] Pt tolerated treatment well. Monitored for 20 minutes post procedure. D/c home ambulatory in no distress. Pt has no further appointments scheduled at [facility].

## 2017-02-02 NOTE — TELEPHONE ENCOUNTER
Pt states Rad/Onc did not pull her PICC line as planned on 1/27/17. I have scheduled this procedure for her for today at 5:00 pm at [facility] with Mu Mcintosh. Pt states this would be a good time for her.

## 2017-02-07 NOTE — PROGRESS NOTES
[facility] Lab Visit:    1700:  Pt arrived ambulatory and in no distress, labs drawn per order, by RN.    1725:Departed [facility] ambulatory and in no distress. Recent Results (from the past 12 hour(s))   CBC WITH AUTOMATED DIFF    Collection Time: 02/07/17  5:20 PM   Result Value Ref Range    WBC 5.4 3.6 - 11.0 K/uL    RBC 3.14 (L) 3.80 - 5.20 M/uL    HGB 7.8 (L) 11.5 - 16.0 g/dL    HCT 24.7 (L) 35.0 - 47.0 %    MCV 78.7 (L) 80.0 - 99.0 FL    MCH 24.8 (L) 26.0 - 34.0 PG    MCHC 31.6 30.0 - 36.5 g/dL    RDW 18.9 (H) 11.5 - 14.5 %    PLATELET 997 433 - 746 K/uL    NEUTROPHILS 76 (H) 32 - 75 %    LYMPHOCYTES 15 12 - 49 %    MONOCYTES 8 5 - 13 %    EOSINOPHILS 1 0 - 7 %    BASOPHILS 0 0 - 1 %    ABS. NEUTROPHILS 4.0 1.8 - 8.0 K/UL    ABS. LYMPHOCYTES 0.8 0.8 - 3.5 K/UL    ABS. MONOCYTES 0.5 0.0 - 1.0 K/UL    ABS. EOSINOPHILS 0.1 0.0 - 0.4 K/UL    ABS. BASOPHILS 0.0 0.0 - 0.1 K/UL   METABOLIC PANEL, COMPREHENSIVE    Collection Time: 02/07/17  5:20 PM   Result Value Ref Range    Sodium 135 (L) 136 - 145 mmol/L    Potassium 3.6 3.5 - 5.1 mmol/L    Chloride 99 97 - 108 mmol/L    CO2 21 21 - 32 mmol/L    Anion gap 15 5 - 15 mmol/L    Glucose 93 65 - 100 mg/dL    BUN 31 (H) 6 - 20 MG/DL    Creatinine 3.48 (H) 0.55 - 1.02 MG/DL    BUN/Creatinine ratio 9 (L) 12 - 20      GFR est AA 17 (L) >60 ml/min/1.73m2    GFR est non-AA 14 (L) >60 ml/min/1.73m2    Calcium 7.5 (L) 8.5 - 10.1 MG/DL    Bilirubin, total 0.6 0.2 - 1.0 MG/DL    ALT (SGPT) 10 (L) 12 - 78 U/L    AST (SGOT) 11 (L) 15 - 37 U/L    Alk.  phosphatase 193 (H) 45 - 117 U/L    Protein, total 6.3 (L) 6.4 - 8.2 g/dL    Albumin 2.9 (L) 3.5 - 5.0 g/dL    Globulin 3.4 2.0 - 4.0 g/dL    A-G Ratio 0.9 (L) 1.1 - 2.2     MAGNESIUM    Collection Time: 02/07/17  5:20 PM   Result Value Ref Range    Magnesium 1.4 (L) 1.6 - 2.4 mg/dL
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
## 2017-02-08 NOTE — TELEPHONE ENCOUNTER
Appointments for type and cross match today before 5pm, and hydration with 2 units prbc 2/9/17 at 11am.

## 2017-02-08 NOTE — PROGRESS NOTES
Pt is coming in to receive 2 units of PRBC's tomorrow and we will follow her creat next week.   If no improvement, will refer to Nephrology

## 2017-02-09 NOTE — PROGRESS NOTES
Pt refused to be monitored 1 hr post transfusion. S/s of transfusion reaction discussed with pt. Pt verbalized understanding.

## 2017-02-09 NOTE — PROGRESS NOTES
Outpatient Infusion Center Progress Note    1110 Pt admit to [facility] for 2 units of PRBCs and 1 liter of fluid  ambulatory in stable condition. Assessment completed. No new concerns voiced. PIV obtained in left arm with positive blood return. Hydration started. NP at bedside obtaining blood consent. Visit Vitals    /80    Pulse 68    Temp 98.4 °F (36.9 °C)    Resp 16    LMP 11/04/2016       Medications:  1 liter NS  Tylenol  2 units PRBCS    1810 Pt tolerated treatment well. PIV removed prior to discharge. D/c home ambulatory in no distress.

## 2017-02-09 NOTE — PROGRESS NOTES
[facility] Lab Visit:     1600: Pt arrived ambulatory and in no distress, labs drawn per order, by RN.      Patient Vitals for the past 24 hrs:   Temp Pulse Resp BP   02/08/17 1600 99.6 °F (37.6 °C) 80 16 140/76     1615:Departed [facility] ambulatory and in no distress

## 2017-02-23 NOTE — TELEPHONE ENCOUNTER
Pt's mother, Piero De Los Santos  and requested a return call from Gayle Yan NP. She would not state what the call was in reference to. I also offered assistance and she declined. Piero De Los Santos may be reached at [phone].

## 2017-02-24 PROBLEM — R63.4 WEIGHT LOSS, ABNORMAL: Status: ACTIVE | Noted: 2017-01-01

## 2017-02-24 PROBLEM — R11.2 NAUSEA & VOMITING: Status: ACTIVE | Noted: 2017-01-01

## 2017-02-24 NOTE — PROGRESS NOTES
Outpatient Infusion Center Progress Note    7340 Pt admit to [facility] for hydration ambulatory but weak in stable condition. Assessment completed. No new concerns voiced. Visit Vitals    /90    Pulse 66    Temp 96.3 °F (35.7 °C)    Resp 18    LMP 11/04/2016       Medications:  2 liters NS    1500 Pt tolerated treatment well. D/c home ambulatory in no distress. Pt to follow up with md banegas. Recent Results (from the past 12 hour(s))   CBC WITH AUTOMATED DIFF    Collection Time: 02/24/17 10:48 AM   Result Value Ref Range    WBC 6.7 3.4 - 10.8 x10E3/uL    RBC 3.72 (L) 3.77 - 5.28 x10E6/uL    HGB 9.8 (L) 11.1 - 15.9 g/dL    HCT 28.5 (L) 34.0 - 46.6 %    MCV 77 (L) 79 - 97 fL    MCH 26.3 (L) 26.6 - 33.0 pg    MCHC 34.4 31.5 - 35.7 g/dL    RDW 20.8 (H) 12.3 - 15.4 %    PLATELET 147 (H) 272 - 379 x10E3/uL    NEUTROPHILS 72 %    Lymphocytes 16 %    MONOCYTES 10 %    EOSINOPHILS 2 %    BASOPHILS 0 %    ABS. NEUTROPHILS 4.8 1.4 - 7.0 x10E3/uL    Abs Lymphocytes 1.1 0.7 - 3.1 x10E3/uL    ABS. MONOCYTES 0.7 0.1 - 0.9 x10E3/uL    ABS. EOSINOPHILS 0.1 0.0 - 0.4 x10E3/uL    ABS. BASOPHILS 0.0 0.0 - 0.2 V96Y5/OO   METABOLIC PANEL, COMPREHENSIVE    Collection Time: 02/24/17 10:48 AM   Result Value Ref Range    Glucose 94 65 - 99 mg/dL     (HH) 6 - 24 mg/dL    Creatinine 7.25 (H) 0.57 - 1.00 mg/dL    GFR est non-AA 6 (L) >59 mL/min/1.73    GFR est AA 7 (L) >59 mL/min/1.73    BUN/Creatinine ratio 16 9 - 23    Sodium 130 (L) 134 - 144 mmol/L    Potassium 4.9 3.5 - 5.2 mmol/L    Chloride 92 (L) 96 - 106 mmol/L    CO2 16 (L) 18 - 29 mmol/L    Calcium 8.7 8.7 - 10.2 mg/dL    Protein, total 6.5 6.0 - 8.5 g/dL    Albumin 3.4 (L) 3.5 - 5.5 g/dL    GLOBULIN, TOTAL 3.1 1.5 - 4.5 g/dL    A-G Ratio 1.1 1.1 - 2.5    Bilirubin, total 0.5 0.0 - 1.2 mg/dL    Alk.  phosphatase 235 (H) 39 - 117 IU/L    AST (SGOT) 10 0 - 40 IU/L    ALT (SGPT) 6 0 - 32 IU/L   MAGNESIUM    Collection Time: 02/24/17 10:48 AM   Result Value Ref Range Magnesium 2.0 1.6 - 2.3 mg/dL   METABOLIC PANEL, BASIC    Collection Time: 02/24/17  3:04 PM   Result Value Ref Range    Sodium 133 (L) 136 - 145 mmol/L    Potassium 4.3 3.5 - 5.1 mmol/L    Chloride 102 97 - 108 mmol/L    CO2 16 (L) 21 - 32 mmol/L    Anion gap 15 5 - 15 mmol/L    Glucose 81 65 - 100 mg/dL     (H) 6 - 20 MG/DL    Creatinine 7.03 (H) 0.55 - 1.02 MG/DL    BUN/Creatinine ratio 15 12 - 20      GFR est AA 8 (L) >60 ml/min/1.73m2    GFR est non-AA 6 (L) >60 ml/min/1.73m2    Calcium 7.9 (L) 8.5 - 10.1 MG/DL

## 2017-02-24 NOTE — PROGRESS NOTES
2/24/2017  6:34 PM    Posthydration labs returned:  Component      Latest Ref Rng & Units 2/24/2017 2/24/2017 2/7/2017 1/26/2017           3:04 PM 10:48 AM  5:20 PM  9:28 AM   Sodium      136 - 145 mmol/L 133 (L) 130 (L) 135 (L) 135 (L)   Potassium      3.5 - 5.1 mmol/L 4.3 4.9 3.6 4.0   Chloride      97 - 108 mmol/L 102 92 (L) 99 103   CO2      21 - 32 mmol/L 16 (L) 16 (L) 21 25   Anion gap      5 - 15 mmol/L 15  15 7   Glucose      65 - 100 mg/dL 81 94 93 132 (H)   BUN      6 - 20 MG/ (H) 113 (HH) 31 (H) 14   Creatinine      0.55 - 1.02 MG/DL 7.03 (H) 7.25 (H) 3.48 (H) 1.55 (H)       Creatinine concerning for ureteral obstruction vs CDDP toxicity  Last IV CDDP: 1/26/2017     Cr: 1.55.    US: 1/9/2017  IMPRESSION  IMPRESSION: Mild right hydronephrosis. Atrophic left kidney with severe  hydronephrosis compatible with the prior PET/CT. Large pelvic mass again  demonstrated    Recent Office Note: 2/8/2017  HISTORY OF PRESENT ILLNESS:  Kristopher Nelson is a 35 y.o. morbidly obese  premenopausal female who was referred to me for endocervical adenocarcinoma by Dr. Jackson Mancia. She presented complaining of heavy menses. A pap smear was positive for adenocarcinoma. An ultrasound then revealed a 4 cm endocervical lesion. An endometrial biopsy also revealed an adenocarcinoma. I was asked to see her in consultation for further evaluation and management.      She has had limited gynecologic care and suffers from severe anxiety. She has been seen at [facility] and [facility] She had a [facility] many years ago for dysplasia at [facility]. Not sure when her last previous pap smear was, but it was probably greater than 5 years ago. Her recent increase in bleeding began about 6 months ago.      On my office exam I suspected advanced local disease. I took her to the OR on 11/4/16 for an EUA/cysto/procto. She had a large cervical tumor replacing the cervix, measuring at least 6 cm.  It appeared to extend along the anterior vaginal wall. Extension to left pelvic sidewall. There was probable bladder involvement, which was confirmed by biopsy. The right ureteral orifice was noted, though the left was not seen. There was no evidence of rectal involvement on proctoscopy. I then sent her for a PET/CT which demonstrated no evidence of metastatic disease outside of the pelvis. She was referred to radiation oncology. She has now completed pelvic radiation with concurrent cisplatin chemotherapy. She presents today for follow-up. She has a lot of complaints today. She reports being tired and becoming short of breath with minimal exertion. Her last labs demonstrated she was anemic. She is also concerned about a vaginal discharge. Also reports having to urinate a lot. Discussed findings with mother, patient not available. Advised further in house evaluation, possible nephrostomy diversion. Hospital beds limited at this time  Patient and mother have expressed an hesitancy to come to ER with the possibility of staying in the ER until a bed is available    The mother will discuss this with the patient but feels she would want to stay home and come in tomorrow. I expressed my availability and the opportunity to come to the ER at anytime for admission. Clinical setting an fear of disease progression as the etiology of the probable ureteral obstruction discussed.     Bill Brice MD

## 2017-02-24 NOTE — PROGRESS NOTES
LAST ENTRY NOTE FROM 2/23/2017 1880:    Pt's mother called late yesterday to inform me that [place] was not [employer] anything\" and is \"more and more fatigued\"  Mother stated she is \"barely getting out of bed she is weak\"  Denied fevers, chills or other complaints.  Said her back pain had improved  Told Mother to bring pt to have labs drawn in the morning and then come to the office for further evaluation  Asked mother if she thought pt soul go the the ER and mother said \"no, she can wait until tomorrow\"  Instructed mother if anything changed to proceed to the ER immediately   She said she would    Nancy Jimenes, NP

## 2017-02-24 NOTE — PROGRESS NOTES
The patient is here to follow up. She complains of abdominal pain, diarrhea, and weight loss. She is no longer taking Flexeril, Zofran, Oxycodone, Flector Patch, or Morphine.

## 2017-02-24 NOTE — TELEPHONE ENCOUNTER
Lay advised that the pt is scheduled with Dr. Ovi Delgado office on Tuesday, 2/28/17. They requested notes to be faxed. They can obtain through [facility].
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
## 2017-02-25 PROBLEM — N19 RENAL FAILURE: Status: ACTIVE | Noted: 2017-01-01

## 2017-02-25 NOTE — PROGRESS NOTES
Nursing supervisor recalled states to call [facility], but I have called them earlier via [facility]. Abiola Guerrier RN NS will ask someone from ED. I also called them about 25 min ago.

## 2017-02-25 NOTE — PROGRESS NOTES
IV team through [facility] called x 2 separate occassions for assistance to help with lab draw states she can't make it. Efforts to draw lab by April RN, Mariela Gupta RN and Lan RN unsuccessful. Dr Chanell Fletcher,. Allyssa Solanous was here earlier when staff was trying to draw blood. Nursing supervisor RAVI Lombardi called states that she will be here in 15 min.
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
## 2017-02-25 NOTE — IP AVS SNAPSHOT
[address] [facility] [address] [place] [address] 
[phone] Patient: Renny Lacy MRN: [id number] :1975 You are allergic to the following Allergen Reactions Coumadin (Warfarin) Other (comments) SEVERE ABDOMINAL CRAMPING Recent Documentation Height  
  
  
  
  
  
 1.689 m Emergency Contacts Name Discharge Info Relation Home Work Mobile Laure Bruner CAREGIVER [3] Mother [14] [phone] About your hospitalization You were admitted on:  2017 You last received care in the:  [address] You were discharged on:  2017 Unit phone number:  [phone] Why you were hospitalized Your primary diagnosis was:  Not on File Your diagnoses also included:  Renal Failure, Hydronephrosis Providers Seen During Your Hospitalizations Provider Role Specialty Primary office phone Talon Palomares MD Attending Provider Gynecologic Oncology [phone] Your Primary Care Physician (PCP) Primary Care Physician Office Phone Office Fax Sara Atwood [phone] Follow-up Information Follow up With Details Comments Contact Info Brian Nguyen III, MD   [address] [place] [address] 
[phone] [address] On 3/3/2017 skilled nursing for nephrectomy tube care Mark Vasquez [address] [facility] [id number] 
[phone] On 3/6/2017 have labs drawn on monday 3/6 [facility] Talon Palomares MD On 3/7/2017 will call to schedule a follow up appointment 200 [facility] Professor Davis [address] [place] [address] 
[phone] Your Appointments 2017  2:00 PM EDT  
2 MONTH with Yannick Benavidez MD  
1310 [facility] Gynecologic Oncology [facility] 200 [facility] [address] 
 [place] [address]  
[phone] Current Discharge Medication List  
  
START taking these medications Dose & Instructions Dispensing Information Comments Morning Noon Evening Bedtime  
 metoclopramide HCl 10 mg tablet Commonly known as:  REGLAN Your next dose is: Today, Tomorrow Other:  _________ Dose:  10 mg Take 1 Tab by mouth Before breakfast, lunch, and dinner for 10 days. Quantity:  30 Tab Refills:  0 phenazopyridine 100 mg tablet Commonly known as:  PYRIDIUM Your next dose is: Today, Tomorrow Other:  _________ Dose:  100 mg Take 1 Tab by mouth three (3) times daily (after meals) for 3 days. Quantity:  9 Tab Refills:  0 CONTINUE these medications which have CHANGED Dose & Instructions Dispensing Information Comments Morning Noon Evening Bedtime  
 oxyCODONE-acetaminophen  mg per tablet Commonly known as:  PERCOCET 10 What changed:  See the new instructions. Your next dose is: Today, Tomorrow Other:  _________ Dose:  1 Tab Take 1 Tab by mouth every four (4) hours as needed for Pain. Max Daily Amount: 6 Tabs. Quantity:  30 Tab Refills:  0 CONTINUE these medications which have NOT CHANGED Dose & Instructions Dispensing Information Comments Morning Noon Evening Bedtime  
 atenolol 25 mg tablet Commonly known as:  TENORMIN Your next dose is: Today, Tomorrow Other:  _________ TK 1 T PO QD Refills:  0  
     
   
   
   
  
 cyclobenzaprine 5 mg tablet Commonly known as:  FLEXERIL Your next dose is: Today, Tomorrow Other:  _________ Dose:  5 mg Take 1 Tab by mouth nightly. Quantity:  30 Tab Refills:  1  
     
   
   
   
  
 dextroamphetamine-amphetamine 20 mg tablet Commonly known as:  ADDERALL Your next dose is: Today, Tomorrow Other:  _________ TK 1 T PO BID Refills:  0  
     
   
   
   
  
 diphenoxylate-atropine 2.5-0.025 mg per tablet Commonly known as:  LOMOTIL Your next dose is: Today, Tomorrow Other:  _________ Dose:  1 Tab Take 1 Tab by mouth four (4) times daily as needed for Diarrhea. Max Daily Amount: 4 Tabs. Quantity:  30 Tab Refills:  1  
     
   
   
   
  
 fentaNYL 12 mcg/hr patch Commonly known as:  Ninetta Pool Your next dose is: Today, Tomorrow Other:  _________ APPLY ONE PATCH TO SKIN EVERY 72 HOURS FOR PAIN Refills:  0  
     
   
   
   
  
 FLECTOR 1.3 % Pt12 Generic drug:  diclofenac Your next dose is: Today, Tomorrow Other:  _________ DARREN 1 PATCH Q 12 H FOR BACK PAIN Refills:  12 LORazepam 2 mg tablet Commonly known as:  ATIVAN Your next dose is: Today, Tomorrow Other:  _________ TK 1 T PO BID PRN Refills:  2  
     
   
   
   
  
 morphine IR 15 mg tablet Commonly known as:  MS IR Your next dose is: Today, Tomorrow Other:  _________ TK 1 TO 2 TS PO Q 4 H PRN P Refills:  0 MUCINEX -30 mg per tablet Generic drug:  guaiFENesin-dextromethorphan SR Your next dose is: Today, Tomorrow Other:  _________ Dose:  1 Tab Take 1 Tab by mouth two (2) times a day. Refills:  0  
     
   
   
   
  
 ondansetron hcl 4 mg tablet Commonly known as:  Corrinne Griselda Your next dose is: Today, Tomorrow Other:  _________ Dose:  4 mg Take 1 Tab by mouth every eight (8) hours as needed for Nausea. Quantity:  30 Tab Refills:  4  
     
   
   
   
  
 venlafaxine 75 mg tablet Commonly known as:  [facility] Your next dose is: Today, Tomorrow Other:  _________ TK 1 T PO BID FOR DEPRESSION Refills:  8 Where to Get Your Medications These medications were sent to [address] AT [address]  [address], 975 [facility] [address] Phone:  [phone] phenazopyridine 100 mg tablet Information on where to get these meds will be given to you by the nurse or doctor. ! Ask your nurse or doctor about these medications  
  metoclopramide HCl 10 mg tablet  
 oxyCODONE-acetaminophen  mg per tablet Discharge Instructions [facility] GYNECOLOGIC ONCOLOGY 
[address] [place], [address] [phone]         [phone] MD Andrew Rudd MD Dianne E. Wall, NP Patient Discharge Instructions Alex Valdivia / [id number] : 1975 Admit Date: 2017 Discharge Date: 3/1/2017 Take Home Medications See Discharge Medication Review provided to you by your nurse. If you did not receive one, request this prior to your discharge. · It is important that you take your medications as they are prescribed. · Keep your medications in the bottles provided by the pharmacist and keep a list of the medication names, dosages, times to be taken and what they are for in your wallet. · Do not take other medications without consulting your doctor. · If you no longer need your prescribed pain medication, you may take Tylenol alone. Due to your kidney function, please avoid any ibuprofen · You should take a daily gentle stool softener such as a colace pill or dulcolax suppository for If constipation persists for >24 hours you should take a dose of Milk of Magnesia. Call if your constipation continues. Diet · Stay hydrated with fluids such as gatorade and water. This will also help prevent constipation.  Limit somewhat any usual caffeine intake of beverages such as soda, tea and coffee as this may serve to dehydrate you. · If nauseated, keep your diet limited to liquids and call if this persists. Activity · Gradually increase your activity each day. There are generally no restrictions on walking, climbing stairs or riding in a car. Try to walk at least 6 times per day. · Showers are okay. If you have an incision, no tub bathing/swimming for two weeks. You will need to keep the dressing over your nephrostomy tube covered with plastic wrap and tape to keep the area dry while you take a quick shower and avoid water going directly over the dsg · No driving while on pain medication. · There are no lifting restrictions since you did not have surgery. Causes For Concern If any of the following occur, please call our office and speak with the Nurse/aid who will help you with your problem or ask the doctor to call you. Problems with the incision/nephrostomy tube site, including increasing pain, swelling, redness or drainage. Persisting nausea or vomiting. Fever or chills and a temperature >100.4F Constipation (no bowel movement for three days). Diarrhea (more than three watery stools within 24 hours). Excessive bloody urine If your nephrostomy tube output decreases significantly or stops all together, please go the ER directly If after hours and you cannot reach an on-call physician, call 911. Follow-Up You will need to have your labs drawn next Monday, 3/6 at the [address] [place] [address] in the [facility] 3rd floor Once we have these results, we will contact you as to when you need to be seen by our office and Dr. Estuardo Contreras, (it will depend on the lab results) Information obtained by : 
I understand that if any problems occur once I am at home I am to contact my physician. I understand and acknowledge receipt of the instructions indicated above. Physician's or R.N.'s Signature                                                                  Date/Time Patient or Representative Signature                                                          Date/Time Discharge Orders None [facility] Announcement We are excited to announce that we are making your provider's discharge notes available to you in [employer]. You will see these notes when they are completed and signed by the physician that discharged you from your recent hospital stay. If you have any questions or concerns about any information you see in [employer], please call the Health Information Department where you were seen or reach out to your Primary Care Provider for more information about your plan of care. Introducing \[facility]\"" & [facility] SERVICES! Dear Cline Boast: Thank you for requesting a [employer] account. Our records indicate that you already have an active [employer] account. You can access your account anytime at [email or web address] Did you know that you can access your hospital and ER discharge instructions at any time in [employer]? You can also review all of your test results from your hospital stay or ER visit. Additional Information If you have questions, please visit the Frequently Asked Questions section of the [employer] website at [email or web address] Remember, [employer] is NOT to be used for urgent needs. For medical emergencies, dial 911. Now available from your iPhone and Android! General Information Please provide this summary of care documentation to your next provider. Patient Signature:  ____________________________________________________________ Date:  ____________________________________________________________  
  
Jess Enamorado Provider Signature:  ____________________________________________________________ Date:  ____________________________________________________________

## 2017-02-25 NOTE — PROGRESS NOTES
Dr Barrett Samayoa on call nephrologist aware of consult. Also notified that [facility] wants to talk to him, cell phone number provided.
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
## 2017-02-25 NOTE — IP AVS SNAPSHOT
Current Discharge Medication List  
  
Take these medications at their scheduled times Dose & Instructions Dispensing Information Comments Morning Noon Evening Bedtime  
 cyclobenzaprine 5 mg tablet Commonly known as:  FLEXERIL Your next dose is: Today, Tomorrow Other:  ____________ Dose:  5 mg Take 1 Tab by mouth nightly. Quantity:  30 Tab Refills:  1  
     
   
   
   
  
 metoclopramide HCl 10 mg tablet Commonly known as:  REGLAN Your next dose is: Today, Tomorrow Other:  ____________ Dose:  10 mg Take 1 Tab by mouth Before breakfast, lunch, and dinner for 10 days. Quantity:  30 Tab Refills:  0 MUCINEX -30 mg per tablet Generic drug:  guaiFENesin-dextromethorphan SR Your next dose is: Today, Tomorrow Other:  ____________ Dose:  1 Tab Take 1 Tab by mouth two (2) times a day. Refills:  0 phenazopyridine 100 mg tablet Commonly known as:  PYRIDIUM Your next dose is: Today, Tomorrow Other:  ____________ Dose:  100 mg Take 1 Tab by mouth three (3) times daily (after meals) for 3 days. Quantity:  9 Tab Refills:  0 Take these medications as needed Dose & Instructions Dispensing Information Comments Morning Noon Evening Bedtime diphenoxylate-atropine 2.5-0.025 mg per tablet Commonly known as:  LOMOTIL Your next dose is: Today, Tomorrow Other:  ____________ Dose:  1 Tab Take 1 Tab by mouth four (4) times daily as needed for Diarrhea. Max Daily Amount: 4 Tabs. Quantity:  30 Tab Refills:  1  
     
   
   
   
  
 ondansetron hcl 4 mg tablet Commonly known as:  Diane Or Your next dose is: Today, Tomorrow Other:  ____________ Dose:  4 mg Take 1 Tab by mouth every eight (8) hours as needed for Nausea. Quantity:  30 Tab Refills:  4  
     
   
   
   
  
 oxyCODONE-acetaminophen  mg per tablet Commonly known as:  PERCOCET 10 Your next dose is: Today, Tomorrow Other:  ____________ Dose:  1 Tab Take 1 Tab by mouth every four (4) hours as needed for Pain. Max Daily Amount: 6 Tabs. Quantity:  30 Tab Refills:  0 Take these medications as directed Dose & Instructions Dispensing Information Comments Morning Noon Evening Bedtime  
 atenolol 25 mg tablet Commonly known as:  TENORMIN Your next dose is: Today, Tomorrow Other:  ____________ TK 1 T PO QD Refills:  0  
     
   
   
   
  
 dextroamphetamine-amphetamine 20 mg tablet Commonly known as:  ADDERALL Your next dose is: Today, Tomorrow Other:  ____________ TK 1 T PO BID Refills:  0  
     
   
   
   
  
 fentaNYL 12 mcg/hr patch Commonly known as:  Raffy Siomara Your next dose is: Today, Tomorrow Other:  ____________ APPLY ONE PATCH TO SKIN EVERY 72 HOURS FOR PAIN Refills:  0  
     
   
   
   
  
 FLECTOR 1.3 % Pt12 Generic drug:  diclofenac Your next dose is: Today, Tomorrow Other:  ____________ DARREN 1 PATCH Q 12 H FOR BACK PAIN Refills:  12 LORazepam 2 mg tablet Commonly known as:  ATIVAN Your next dose is: Today, Tomorrow Other:  ____________ TK 1 T PO BID PRN Refills:  2  
     
   
   
   
  
 morphine IR 15 mg tablet Commonly known as:  MS IR Your next dose is: Today, Tomorrow Other:  ____________ TK 1 TO 2 TS PO Q 4 H PRN P Refills:  0  
     
   
   
   
  
 venlafaxine 75 mg tablet Commonly known as:  [facility] Your next dose is: Today, Tomorrow Other:  ____________ TK 1 T PO BID FOR DEPRESSION Refills:  8 Where to Get Your Medications These medications were sent to [address] [place] [address] AT [address]  [address], [address] [facility] [address] Phone:  [phone] phenazopyridine 100 mg tablet Information about where to get these medications is not yet available ! Ask your nurse or doctor about these medications  
  metoclopramide HCl 10 mg tablet  
 oxyCODONE-acetaminophen  mg per tablet

## 2017-02-26 PROBLEM — N13.30 HYDRONEPHROSIS: Status: ACTIVE | Noted: 2017-01-01

## 2017-02-26 NOTE — PROGRESS NOTES
Bedside and verbal shift change report given to [address] [place] [address] [place] (oncoming nurse) by Nael Hart RN (offgoing nurse). Report included the following information SBAR, Intake/Output and MAR.

## 2017-02-26 NOTE — PROGRESS NOTES
TRANSFER - OUT REPORT:    Verbal report given to HERMINIA Lynn RN(name) on Seda  being transferred to [facility](unit) for routine progression of care       Report consisted of patients Situation, Background, Assessment and   Recommendations(SBAR). Information from the following report(s) SBAR, Procedure Summary, Intake/Output, MAR and Recent Results was reviewed with the receiving nurse. Lines:   Venous Access Device (Active)        Opportunity for questions and clarification was provided.

## 2017-02-26 NOTE — CONSULTS
Urology Consult    Subjective:     Date of Consultation:  February 26, 2017    Referring Physician: Berkley Campoverde    Reason for Consultation:  [facility], renal failure    Patient Name: Jacob Dugan  MRN: [id number]    History of Present Illness:   Patient is a 35 y.o.  female who is being seen for hydro and renal failure. She was admitted to the hospital for Renal Failure  Renal failure. Past Medical History:   Diagnosis Date    ADD (attention deficit disorder)     Cancer (HCC)     CERVICAL    Chronic pain     KNEES    GERD (gastroesophageal reflux disease)     History of panic attacks     Hypertension     Ill-defined condition 2014    HX HIVES CONJUCTIVA EYES PER PATIENT    Morbid obesity ([address]     SOB (shortness of breath)     SOB WITH EXERTION      Past Surgical History:   Procedure Laterality Date    HX GYN      CKC,     HX HEENT      WISDOM TEETH    HX VASCULAR ACCESS      PICC line RT AC      Family History   Problem Relation Age of Onset    Anxiety Mother     Attention Deficit Disorder Mother     Depression Mother     Cancer Father      LUNG    Cancer Maternal Grandmother      COLON    Anesth Problems Neg Hx       Social History   Substance Use Topics    Smoking status: Former Smoker     Quit date: 11/3/2014    Smokeless tobacco: Never Used    Alcohol use No     Allergies   Allergen Reactions    Coumadin [Warfarin] Other (comments)     SEVERE ABDOMINAL CRAMPING      Prior to Admission medications    Medication Sig Start Date End Date Taking?  Authorizing Provider   FLECTOR 1.3 % pt12 DARREN 1 PATCH Q 12 H FOR BACK PAIN 2/14/17   Historical Provider   fentaNYL (DURAGESIC) 12 mcg/hr patch APPLY ONE PATCH TO SKIN EVERY 72 HOURS FOR PAIN 1/6/17   Historical Provider   morphine IR (MS IR) 15 mg tablet TK 1 TO 2 TS PO Q 4 H PRN P 11/28/16   Historical Provider   oxyCODONE-acetaminophen (PERCOCET 10)  mg per tablet TK 1 TO 2 TS PO Q 4 H PRN P 1/20/17   Historical Provider cyclobenzaprine (FLEXERIL) 5 mg tablet Take 1 Tab by mouth nightly. 17   Nunu Olson NP   oxyCODONE-acetaminophen (PERCOCET) 5-325 mg per tablet Take 1 Tab by mouth every four (4) hours as needed for Pain. Max Daily Amount: 6 Tabs. 17   Roma Dior MD   diphenoxylate-atropine (LOMOTIL) 2.5-0.025 mg per tablet Take 1 Tab by mouth four (4) times daily as needed for Diarrhea. Max Daily Amount: 4 Tabs. 16   Nunu Olson NP   ondansetron hcl (ZOFRAN) 4 mg tablet Take 1 Tab by mouth every eight (8) hours as needed for Nausea. 16   Nunu Olson NP   guaiFENesin-dextromethorphan (MUCINEX DM) 600-30 mg per tablet Take 1 Tab by mouth two (2) times a day. Historical Provider   atenolol (TENORMIN) 25 mg tablet TK 1 T PO QD 16   Historical Provider   dextroamphetamine-amphetamine (ADDERALL) 20 mg tablet TK 1 T PO BID 10/26/16   Historical Provider   LORazepam (ATIVAN) 2 mg tablet TK 1 T PO BID PRN 10/18/16   Historical Provider   venlafaxine (EFFEXOR) 75 mg tablet TK 1 T PO BID FOR DEPRESSION 10/23/16   Historical Provider         Review of Systems:  A comprehensive review of systems was negative except for that written in the HPI.     Objective:     Data Review (Labs):    Recent Labs      17   0337  17   1525  17   1504  17   1048   WBC   --   5.5   --   6.7   HGB   --   9.1*   --   9.8*   MCV   --   79.7*   --   77*   PLT   --   351   --   422*   NA  136  134*  133*  130*   K  4.4  4.1  4.3  4.9   CREA  8.59*  8.12*  7.03*  7.25*   BUN  103*  104*  103*  113*   ALB  2.6*  2.7*   --   3.4*   TBILI   --   0.4   --   0.5   SGOT   --   9*   --   10   ALT   --   10*   --   6   AP   --   229*   --   235*       Patient Vitals for the past 8 hrs:   BP Temp Pulse Resp SpO2   17 0329 140/81 96.5 °F (35.8 °C) 71 18 99 %     Temp (24hrs), Av.7 °F (36.5 °C), Min:96.5 °F (35.8 °C), Max:98.3 °F (36.8 °C)      Intake and Output:        Physical Exam: General:    alert, cooperative, no distress, appears stated age                     Skin:  Normal.   Lymph nodes:  Cervical, supraclavicular, and axillary nodes normal.             Abdomen[de-identified]  soft, non-tender. Bowel sounds normal. No masses,  no organomegaly             Genitalia:  defer exam          Extremities:  negative       Assessment:     Active Problems:    Renal failure (2/25/2017)          Acute renal failure after chemo with new finding of right hydro. Left kidney smaller and hydro present previously. Pelvic mass and prev cysto and exam showing bladder involvement. Goldsmith placed yesterday with very little output. Discussed with Dr Manish Douglass and Dr Barrett Samayoa . She needs a drainage procedure. Given bladder involvement and prev cysto showing ureteral orifice involvement, may be difficult to get up from below. i am happy to try but perc neph tube probably more of a sure thing and anesthesia is risky given her bicarb level. Will ask IR to place right perc. She wants her mother to help w decision. Thanks. Plan:     As above.      Signed By: Rosalia Monae MD                         February 26, 2017

## 2017-02-26 NOTE — PROGRESS NOTES
Nephrology Progress Note     [address]       [email or web address]                   Phone - [phone]   Patient: Alireza Rosales  YOB: 1975     Date- 2/26/2017     CC: Follow up for arf         Subjective: Interval History:   -   Cr. Worse  Acidosis worse  k stable  Urine out put 125 ml    No c/o sob, fever. c/o nausea  No vomiting  No c/o chest pain     Assessment:   · ROOPA- DD: Cisplatin Nephrotoxicity, ATN, obtruction  Acidosis due to roopa  Right hydronephrosis  Pelvic mass  Metastatic cervical cancer         Plan:   Continue iv bicarb. Start d5w with 3 amp bicarb  Consult IR for [facility] today  Urology input noted. Plan for per cut. Nephrostomy today  Follow bmp  Discussed risks, benefits and alternatives to dialysis. Risks including, but not limited to dialyzer reactions, dialysis dysequlibrium syndrome, low blood pressure, leg cramps, arrythmia etc were discussed. Also explained that, refusal of dialysis (when absolutely needed) may lead to death. Patient understands the options and agrees to proceed. Care Plan discussed with: pt , mom, nurse  [] High complexity decision making was performed  [x] Patient is at high-risk of decompensation with multiple organ involvement  Review of Systems: Pertinent items are noted in HPI.   Objective:   Vitals:    02/25/17 1145 02/25/17 1948 02/26/17 0329 02/26/17 0851   BP: 133/85 142/85 140/81 130/72   Pulse: 72 72 71 78   Resp: 16 18 18 16   Temp: 98.3 °F (36.8 °C) 98.2 °F (36.8 °C) 96.5 °F (35.8 °C) 97.8 °F (36.6 °C)   SpO2: 97% 98% 99% 98%       Intake/Output Summary (Last 24 hours) at 02/26/17 1220  Last data filed at 02/26/17 0751   Gross per 24 hour   Intake                0 ml   Output              125 ml   Net             -125 ml     Physical Exam:   GEN: NAD  NECK- Supple, no thyromegaly  RESP: Clear b/l, no wheezing  CVS: RRR,S1,S2   NEURO: non focal, normal speech  SKIN: No Rash  ABDO: soft , non tender, No hepatosplenomegaly  EXT: No Edema   gu - ron +    Chart reviewed. Pertinent Notes reviewed. Medications list  reviewed     Current Facility-Administered Medications   Medication    acetaminophen (TYLENOL) tablet 650 mg    dextrose 5% 1,000 mL with sodium bicarbonate (8.4%) 150 mEq infusion    diphenoxylate-atropine (LOMOTIL) tablet 1 Tab    fentaNYL (DURAGESIC) 12 mcg/hr patch 1 Patch    morphine IR (MS IR) tablet 15 mg    ondansetron (ZOFRAN ODT) tablet 4 mg    venlafaxine (EFFEXOR) tablet 75 mg    sodium chloride (NS) flush 5-10 mL    sodium chloride (NS) flush 5-10 mL    LORazepam (ATIVAN) injection 1 mg              Data Review :  Recent Labs      02/26/17   0337  02/25/17   1525  02/24/17   1504  02/24/17   1048   NA  136  134*  133*  130*   K  4.4  4.1  4.3  4.9   CL  105  103  102  92*   CO2  15*  18*  16*  16*   GLU  87  78  81  94   BUN  103*  104*  103*  113*   CREA  8.59*  8.12*  7.03*  7.25*   CA  8.4*  8.5  7.9*  8.7   MG   --    --    --   2.0   PHOS  6.1*   --    --    --    ALB  2.6*  2.7*   --   3.4*   SGOT   --   9*   --   10   ALT   --   10*   --   6     Recent Labs      02/25/17   1525  02/24/17   1048   WBC  5.5  6.7   HGB  9.1*  9.8*   HCT  28.3*  28.5*   PLT  351  422*     No results found for: CULT  No results found for: MD [facility]  [facility] [address], [address] [place], [address]  Phone - [phone]         Fax - [phone] [facility] Office  [address] [address]  Phone - [phone]        Fax - [phone]     www. [email or web address]

## 2017-02-26 NOTE — PROGRESS NOTES
TRANSFER - IN REPORT:    Verbal report received from [facility]) on [employer]  being received from [facility](unit) for post ordered procedure      Report consisted of patients Situation, Background, Assessment and   Recommendations(SBAR). Information from the following report(s) SBAR, Intake/Output and MAR was reviewed with the receiving nurse. Opportunity for questions and clarification was provided. Assessment completed upon patients arrival to unit and care assumed.

## 2017-02-26 NOTE — PROGRESS NOTES
Seen by Dr Brenda Whalen nephrologist, pt for dialysis today, cath insertion by IR, called to [facility].
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
## 2017-02-26 NOTE — H&P
Radiology History and Physical    Patient: Duane Ardon 35 y.o. female       Chief Complaint: No chief complaint on file. History of Present Illness: hydronephrosis for perc    History:    Past Medical History:   Diagnosis Date    ADD (attention deficit disorder)     Cancer (HCC)     CERVICAL    Chronic pain     KNEES    GERD (gastroesophageal reflux disease)     History of panic attacks     Hypertension     Ill-defined condition 2014    HX HIVES CONJUCTIVA EYES PER PATIENT    Morbid obesity ([address]     SOB (shortness of breath)     SOB WITH EXERTION     Family History   Problem Relation Age of Onset    Anxiety Mother     Attention Deficit Disorder Mother     Depression Mother     Cancer Father      LUNG    Cancer Maternal Grandmother      COLON    Anesth Problems Neg Hx      Social History     Social History    Marital status: SINGLE     Spouse name: N/A    Number of children: N/A    Years of education: N/A     Occupational History    Not on file. Social History Main Topics    Smoking status: Former Smoker     Quit date: 11/3/2014    Smokeless tobacco: Never Used    Alcohol use No    Drug use: No    Sexual activity: Not on file     Other Topics Concern    Not on file     Social History Narrative       Allergies:    Allergies   Allergen Reactions    Coumadin [Warfarin] Other (comments)     SEVERE ABDOMINAL CRAMPING       Current Medications:  Current Facility-Administered Medications   Medication Dose Route Frequency    acetaminophen (TYLENOL) tablet 650 mg  650 mg Oral Q4H PRN    dextrose 5% 1,000 mL with sodium bicarbonate (8.4%) 150 mEq infusion   IntraVENous CONTINUOUS    sodium bicarbonate (NEUT) injection 5 mL  5 mL SubCUTAneous RAD ONCE    iopamidol (ISOVUE 300) 61 % contrast injection 50 mL  50 mL IntraVENous RAD ONCE    heparin (porcine) 1,000 unit/mL injection        lidocaine (XYLOCAINE) 20 mg/mL (2 %) injection 200 mg  200 mg IntraDERMal ONCE    midazolam (VERSED) injection 5 mg  5 mg IntraVENous Multiple    fentaNYL citrate (PF) injection 200 mcg  200 mcg IntraVENous Multiple    saline peripheral flush soln 10 mL  10 mL InterCATHeter PRN    0.9% sodium chloride infusion 500 mL  500 mL IntraVENous CONTINUOUS    midazolam (VERSED) 1 mg/mL injection        fentaNYL citrate (PF) 50 mcg/mL injection        diphenoxylate-atropine (LOMOTIL) tablet 1 Tab  1 Tab Oral QID PRN    fentaNYL (DURAGESIC) 12 mcg/hr patch 1 Patch  1 Patch TransDERmal Q72H    morphine IR (MS IR) tablet 15 mg  15 mg Oral Q4H PRN    ondansetron (ZOFRAN ODT) tablet 4 mg  4 mg Oral Q8H PRN    venlafaxine (EFFEXOR) tablet 75 mg  75 mg Oral DAILY    sodium chloride (NS) flush 5-10 mL  5-10 mL IntraVENous Q8H    sodium chloride (NS) flush 5-10 mL  5-10 mL IntraVENous PRN    LORazepam (ATIVAN) injection 1 mg  1 mg IntraVENous Q6H PRN        Physical Exam:  Blood pressure 130/74, pulse 66, temperature 97.8 °F (36.6 °C), resp. rate 11, last menstrual period 11/04/2016, SpO2 98 %. LUNG: clear to auscultation bilaterally, HEART: regular rate and rhythm      Alerts:    Hospital Problems  Date Reviewed: 2/7/2017          Codes Class Noted POA    Hydronephrosis ICD-10-CM: N13.30  ICD-9-CM: 387  2/26/2017 Yes        Renal failure ICD-10-CM: N19  ICD-9-CM: 754  2/25/2017 Yes              Laboratory:      Recent Labs      02/26/17   0337  02/25/17   1525   HGB   --   9.1*   HCT   --   28.3*   WBC   --   5.5   PLT   --   351   BUN  103*  104*   CREA  8.59*  8.12*   K  4.4  4.1         Plan of Care/Planned Procedure:  Risks, benefits, and alternatives reviewed with patient and she agrees to proceed with the procedure.        Ellen Engle MD

## 2017-02-26 NOTE — PROGRESS NOTES
2/25/2017  9:24 PM    US:  The right kidney measures 12.6 cm, and the left kidney measures 10.9 cm. Renal  contour and echogenicity are normal. There is no mass or shadowing calculus. There is hydronephrosis. There is mild right and moderate left hydronephrosis.     The ureters are not dilated.      The aorta and proximal origins of the iliac arteries are obscured by bowel gas. The IVC is patent.      The urinary bladder is mildly distended. There is an area of echogenicity in the  posterior dependent urinary bladder measuring 4.1 x 1.8 x 2.6 cm.     IMPRESSION  IMPRESSION:   1. Mild right and moderate left hydronephrosis.     2. An area of echogenicity in the posterior dependent urinary bladder may  represent infectious debris, blood products, or a mass. FINDINGS: Transabdominal ultrasound:  Urinary bladder: within normal limits.     Uterus: measures 12.4 x 5.1 x 8.6 cm, which is within normal limits. The  myometrium is diffusely heterogeneous.     There is a heterogeneous mass in the region of the cervix measuring 5.0 x 3.8 x  4.8 cm.     Endometrium: Obscured by distance from skin and bowel gas.     The ovaries are not visualized due to bowel gas.     Free fluid: None.     Endovaginal ultrasound: There is a heterogeneous mass in the region of the cervix measuring 7.9 x 3.9 x  6.3 cm. This limits evaluation of the uterus and ovaries.     Free fluid: None.     IMPRESSION  IMPRESSION:   Findings of a heterogeneous mass in the region of the cervix measuring 7.9 x 3.9  x 6.3 cm. Further evaluation with CT is recommended. Diffusely heterogeneous  uterus. Nonvisualized ovaries. CT: Non-contrast  FINDINGS:   Solid organ evaluation is limited without contrast.      The visualized lung bases demonstrate no mass or consolidation. The heart size  is normal. There is a small pericardial effusion. There is no pleural effusion.     The left kidney is atrophic.  There is mild right and severe left hydronephrosis,  unchanged compared to prior PET/CT.     The liver is diffusely low in attenuation without focal mass. The spleen,  pancreas, and adrenal glands are normal. The gall bladder is present without  intra- or extra-hepatic biliary dilatation.      There are no dilated bowel loops. The appendix is not well seen. There is  diffuse mesenteric edema. There are no enlarged lymph nodes. There is no free  fluid or free air. The aorta tapers without aneurysm.     Echogenic material in the posterior dependent urinary bladder on recent  ultrasound is not well demonstrated on this examination. The bladder is mildly  distended.     Heterogeneous mass or collection in the pelvis between the urinary bladder and  rectum measures 6.5 x 8.4 x 8.0 cm (series 3, image 81).    The bony structures are age-appropriate.     IMPRESSION  IMPRESSION:   1. A heterogeneous mass /collection in the pelvis measures 7.8 cm in greatest  diameter. This does not appear significantly changed in size when compared to  the area of increased tracer activity from PET/CT on 11/18/2016 and is in  keeping with the patient's history of cervical cancer.     2. Diffuse mesenteric edema and small pericardial effusion.     3. The atrophic left kidney with mild right and severe left hydronephrosis is  unchanged since 11/8/2016.     4. Hepatic steatosis. Appreciate management consultation of Nephrology    Impression:   Persistent cervical cancer    Secondary bilateral ureteral obstruction     Resultant renal insufficiency. Possilble CDDP toxicity component.     Plan:   Urology consult for stent placement   Possible IR consultation for nephrostomy    oJse Anne MD

## 2017-02-26 NOTE — PROCEDURES
Interventional Radiology        2/26/2017    Informed consent obtained    Diagnosis: cervical ca      Procedure(s): rt perc and ureteral stent       Specimens removed:  none    Complications: None    Primary Physician: Kamilah Cooper MD    Recomendations: perc to gravity until cr decreases, nephrostogram and perc removal later this week    Discharge Disposition: floor    Full dictated report to follow    Signed By: Kamilah Cooper MD

## 2017-02-26 NOTE — CONSULTS
[address]  YOB: 1975     Assessment & Plan:   1. ROOPA  · DD: Cisplatin Nephrotoxicity, ATN, obtruction  · Get US: If Rt hydro: PCN, She has h/o mild rt hydro, severe left hydro and atrophic kidney   · Cr worsening from 1 2017 to today  · No urgent RRT needed  · She may need HD if Cr not better in 12-24 hrs  · LINE,HD:Indications,complications,risk,benefits,alternatives dw pt and motherr, voiced understanding, and agree with HD if not better  · IVF for now  · UA pending  · Goldsmith,Urology eval, CT  2. Anemia  · Hb 9  3. Mild Hyponatremia  · DD: IVVD, SIADH ( difficult to diagnosis due to severe ROOPA)  4. Acidosis  · Due to ROOPA  · Start Bicarb            Subjective:   CHIEF COMPLAIN:arf  HPI: 35 yrs old WF is here with NV and severe ROOPA. She has progressive decline in GFR since earlier this year:  Cr was 8.1 mg% today, 7 mg% yesterday, 3.5 mg% 2 7 17. Her cr was 1.55 mg% in 1 2017. She has been receiving Cisplatin Chemo. She had US last month which showed left hydro:? Atrophic kidney. Today, US showed ( prelim) rt hydro: . Denies NSAIDs. She has Met Cervical ca,  She is on Atenolol for HTN. She is Effecor and Ativan,  Denies NSAIDs. Recently got 2 unit PRBCs. No contrast.  No bleeding in 2 months,. No rash except groin. WT Loss 70 lbs since 11 2016. OR on 11/4/16 for an EUA/cysto/proct:large cervical tumor replacing the cervix, measuring at least 6 cm. It appeared to extend along the anterior vaginal wall. Extension to left pelvic sidewall. There was probable bladder involvement, which was confirmed by biopsy. The right ureteral orifice was noted, though the left was not seen. There was no evidence of rectal involvement on proctoscopy  Review of Systems  A comprehensive review of systems was negative except for that written in the HPI.     Past Medical History:   Diagnosis Date    ADD (attention deficit disorder)     Cancer ([address]     CERVICAL    Chronic pain     KNEES    GERD (gastroesophageal reflux disease)     History of panic attacks     Hypertension     Ill-defined condition 2014    HX HIVES CONJUCTIVA EYES PER PATIENT    Morbid obesity ([address]     SOB (shortness of breath)     SOB WITH EXERTION      Past Surgical History:   Procedure Laterality Date    HX GYN      CKC,     HX HEENT      WISDOM TEETH    HX VASCULAR ACCESS      PICC line RT AC       Social History     Social History    Marital status: SINGLE     Spouse name: N/A    Number of children: N/A    Years of education: N/A     Occupational History    Not on file. Social History Main Topics    Smoking status: Former Smoker     Quit date: 11/3/2014    Smokeless tobacco: Never Used    Alcohol use No    Drug use: No    Sexual activity: Not on file     Other Topics Concern    Not on file     Social History Narrative      Family History   Problem Relation Age of Onset    Anxiety Mother     Attention Deficit Disorder Mother     Depression Mother     Cancer Father      LUNG    Cancer Maternal Grandmother      COLON    Anesth Problems Neg Hx       Prior to Admission medications    Medication Sig Start Date End Date Taking? Authorizing Provider   FLECTOR 1.3 % pt12 DARREN 1 PATCH Q 12 H FOR BACK PAIN 2/14/17   Historical Provider   fentaNYL (DURAGESIC) 12 mcg/hr patch APPLY ONE PATCH TO SKIN EVERY 72 HOURS FOR PAIN 1/6/17   Historical Provider   morphine IR (MS IR) 15 mg tablet TK 1 TO 2 TS PO Q 4 H PRN P 11/28/16   Historical Provider   oxyCODONE-acetaminophen (PERCOCET 10)  mg per tablet TK 1 TO 2 TS PO Q 4 H PRN P 1/20/17   Historical Provider   cyclobenzaprine (FLEXERIL) 5 mg tablet Take 1 Tab by mouth nightly. 2/7/17   Nunu Olson NP   oxyCODONE-acetaminophen (PERCOCET) 5-325 mg per tablet Take 1 Tab by mouth every four (4) hours as needed for Pain. Max Daily Amount: 6 Tabs.  1/16/17   Fransisca Ceballos MD   diphenoxylate-atropine (LOMOTIL) 2.5-0.025 mg per tablet Take 1 Tab by mouth four (4) times daily as needed for Diarrhea. Max Daily Amount: 4 Tabs. 16   Nunu Olson NP   ondansetron hcl (ZOFRAN) 4 mg tablet Take 1 Tab by mouth every eight (8) hours as needed for Nausea. 16   Nunu Olson NP   guaiFENesin-dextromethorphan (MUCINEX DM) 600-30 mg per tablet Take 1 Tab by mouth two (2) times a day. Historical Provider   atenolol (TENORMIN) 25 mg tablet TK 1 T PO QD 16   Historical Provider   dextroamphetamine-amphetamine (ADDERALL) 20 mg tablet TK 1 T PO BID 10/26/16   Historical Provider   LORazepam (ATIVAN) 2 mg tablet TK 1 T PO BID PRN 10/18/16   Historical Provider   venlafaxine (EFFEXOR) 75 mg tablet TK 1 T PO BID FOR DEPRESSION 10/23/16   Historical Provider     Allergies   Allergen Reactions    Coumadin [Warfarin] Other (comments)     SEVERE ABDOMINAL CRAMPING       Objective:     Vitals:  Blood pressure 133/85, pulse 72, temperature 98.3 °F (36.8 °C), resp. rate 16, last menstrual period 2016, SpO2 97 %.   Temp (24hrs), Av.3 °F (36.8 °C), Min:98.3 °F (36.8 °C), Max:98.3 °F (36.8 °C)      Intake and Output:          Physical Exam:                Patient is intubated:  no    Physical Examination:   GENERAL ASSESSMENT: anxious  SKIN: normal color, no lesions  HEAD: normocephalic  EYES: normal eyes  NECK: normal  CHEST: normal air exchange, no rales, no rhonchi, no wheezes, respiratory effort normal with no retractions  HEART: regular rate and rhythm, normal S1/S2, no murmurs  ABDOMEN: soft, non-distended, no masses, no hepatosplenomegaly  :Goldsmith: no  EXTREMITY: no joint swelling  NEURO: gross motor exam normal by observation      ECG/rhythm[de-identified] Rev:yes  Xray/CT/US/MRI REV:yes  Data Review   Recent Results (from the past 72 hour(s))   CBC WITH AUTOMATED DIFF    Collection Time: 17 10:48 AM   Result Value Ref Range    WBC 6.7 3.4 - 10.8 x10E3/uL    RBC 3.72 (L) 3.77 - 5.28 x10E6/uL    HGB 9.8 (L) 11.1 - 15.9 g/dL    HCT 28.5 (L) 34.0 - 46.6 %    MCV 77 (L) 79 - 97 fL    MCH 26.3 (L) 26.6 - 33.0 pg    MCHC 34.4 31.5 - 35.7 g/dL    RDW 20.8 (H) 12.3 - 15.4 %    PLATELET 249 (H) 576 - 379 x10E3/uL    NEUTROPHILS 72 %    Lymphocytes 16 %    MONOCYTES 10 %    EOSINOPHILS 2 %    BASOPHILS 0 %    ABS. NEUTROPHILS 4.8 1.4 - 7.0 x10E3/uL    Abs Lymphocytes 1.1 0.7 - 3.1 x10E3/uL    ABS. MONOCYTES 0.7 0.1 - 0.9 x10E3/uL    ABS. EOSINOPHILS 0.1 0.0 - 0.4 x10E3/uL    ABS. BASOPHILS 0.0 0.0 - 0.2 U72U6/GA   METABOLIC PANEL, COMPREHENSIVE    Collection Time: 02/24/17 10:48 AM   Result Value Ref Range    Glucose 94 65 - 99 mg/dL     (HH) 6 - 24 mg/dL    Creatinine 7.25 (H) 0.57 - 1.00 mg/dL    GFR est non-AA 6 (L) >59 mL/min/1.73    GFR est AA 7 (L) >59 mL/min/1.73    BUN/Creatinine ratio 16 9 - 23    Sodium 130 (L) 134 - 144 mmol/L    Potassium 4.9 3.5 - 5.2 mmol/L    Chloride 92 (L) 96 - 106 mmol/L    CO2 16 (L) 18 - 29 mmol/L    Calcium 8.7 8.7 - 10.2 mg/dL    Protein, total 6.5 6.0 - 8.5 g/dL    Albumin 3.4 (L) 3.5 - 5.5 g/dL    GLOBULIN, TOTAL 3.1 1.5 - 4.5 g/dL    A-G Ratio 1.1 1.1 - 2.5    Bilirubin, total 0.5 0.0 - 1.2 mg/dL    Alk.  phosphatase 235 (H) 39 - 117 IU/L    AST (SGOT) 10 0 - 40 IU/L    ALT (SGPT) 6 0 - 32 IU/L   MAGNESIUM    Collection Time: 02/24/17 10:48 AM   Result Value Ref Range    Magnesium 2.0 1.6 - 2.3 mg/dL   METABOLIC PANEL, BASIC    Collection Time: 02/24/17  3:04 PM   Result Value Ref Range    Sodium 133 (L) 136 - 145 mmol/L    Potassium 4.3 3.5 - 5.1 mmol/L    Chloride 102 97 - 108 mmol/L    CO2 16 (L) 21 - 32 mmol/L    Anion gap 15 5 - 15 mmol/L    Glucose 81 65 - 100 mg/dL     (H) 6 - 20 MG/DL    Creatinine 7.03 (H) 0.55 - 1.02 MG/DL    BUN/Creatinine ratio 15 12 - 20      GFR est AA 8 (L) >60 ml/min/1.73m2    GFR est non-AA 6 (L) >60 ml/min/1.73m2    Calcium 7.9 (L) 8.5 - 10.1 MG/DL   CBC WITH AUTOMATED DIFF    Collection Time: 02/25/17  3:25 PM   Result Value Ref Range    WBC 5.5 3.6 - 11.0 K/uL    RBC 3.55 (L) 3.80 - 5.20 M/uL    HGB 9.1 (L) 11.5 - 16.0 g/dL    HCT 28.3 (L) 35.0 - 47.0 %    MCV 79.7 (L) 80.0 - 99.0 FL    MCH 25.6 (L) 26.0 - 34.0 PG    MCHC 32.2 30.0 - 36.5 g/dL    RDW 19.3 (H) 11.5 - 14.5 %    PLATELET 054 934 - 921 K/uL    NEUTROPHILS 72 32 - 75 %    LYMPHOCYTES 16 12 - 49 %    MONOCYTES 10 5 - 13 %    EOSINOPHILS 2 0 - 7 %    BASOPHILS 0 0 - 1 %    ABS. NEUTROPHILS 3.9 1.8 - 8.0 K/UL    ABS. LYMPHOCYTES 0.9 0.8 - 3.5 K/UL    ABS. MONOCYTES 0.6 0.0 - 1.0 K/UL    ABS. EOSINOPHILS 0.1 0.0 - 0.4 K/UL    ABS. BASOPHILS 0.0 0.0 - 0.1 K/UL   METABOLIC PANEL, COMPREHENSIVE    Collection Time: 02/25/17  3:25 PM   Result Value Ref Range    Sodium 134 (L) 136 - 145 mmol/L    Potassium 4.1 3.5 - 5.1 mmol/L    Chloride 103 97 - 108 mmol/L    CO2 18 (L) 21 - 32 mmol/L    Anion gap 13 5 - 15 mmol/L    Glucose 78 65 - 100 mg/dL     (H) 6 - 20 MG/DL    Creatinine 8.12 (H) 0.55 - 1.02 MG/DL    BUN/Creatinine ratio 13 12 - 20      GFR est AA 7 (L) >60 ml/min/1.73m2    GFR est non-AA 5 (L) >60 ml/min/1.73m2    Calcium 8.5 8.5 - 10.1 MG/DL    Bilirubin, total 0.4 0.2 - 1.0 MG/DL    ALT (SGPT) 10 (L) 12 - 78 U/L    AST (SGOT) 9 (L) 15 - 37 U/L    Alk. phosphatase 229 (H) 45 - 117 U/L    Protein, total 6.7 6.4 - 8.2 g/dL    Albumin 2.7 (L) 3.5 - 5.0 g/dL    Globulin 4.0 2.0 - 4.0 g/dL    A-G Ratio 0.7 (L) 1.1 - 2.2     CREATININE, UR, RANDOM    Collection Time: 02/25/17  5:36 PM   Result Value Ref Range    Creatinine, urine 78.60 mg/dL   SODIUM, UR, RANDOM    Collection Time: 02/25/17  5:36 PM   Result Value Ref Range    Sodium urine, random 44 MMOL/L       Discussed with:    Patient, Family, Nurse and Attending/Consulting  Thank you so much to allow us to participate in this patient's care. We will follow.  : Randal Black MD  2/25/2017      [facility]:  [email or web address]  Lu Christ[place] office:  [address] [place] [address] [address], [address] [facility]  Phone: [phone]  Fax :     [phone]    [place] office:  [address] [place] [address]  [place], [address] [place] [address]  Phone - [phone]  Fax - [phone]

## 2017-02-26 NOTE — PROGRESS NOTES
Consult to Dr Gio Olivier urologist has been called. Dr Reece Feliz updated DR Jason Gallardo of the plan of care and US results.

## 2017-02-27 NOTE — PROGRESS NOTES
Care Management Interventions  PCP Verified by CM: No  Mode of Transport at Discharge: Other (see comment) (private vehicle)  Discharge Durable Medical Equipment: No  Physical Therapy Consult: No  Occupational Therapy Consult: No  Speech Therapy Consult: No  Current Support Network:  (independent with ADLs)  Confirm Follow Up Transport: Family  Plan discussed with Pt/Family/Caregiver: No  Freedom of Choice Offered: Yes  Discharge Location  Discharge Placement: Home    CM reviewed chart. Pt is independent with ADLs and IADLs. Pt has very supportive and involved family. CM will continue to follow incase any home health needs arise.   Flaco Tian, PREMAW, ACM

## 2017-02-27 NOTE — DIALYSIS
[facility]                         [phone]  Vitals Pre Post Assessment Pre Post   /70 126/79 LOC AOx3 AOx3   HR 65 66 Lungs CTA CTA   Temp 97.9 97.6 Cardiac Regular Regular   Resp 16 16 Skin Warm, dy Warm, dry   Weight NANDO NANDO Edema Gen. Puffiness Gen. Puffiness      Pain Denies Denies     Orders   Duration: Start: 0100 End: 0300 Total: 2 hours   Dialyzer: Revaclear   K Bath: 2.5   Ca Bath: 2.5   Na / Bicarb: 140/40   Target Fluid Removal: 0     Access   Type & Location: RIJ CVC    Comments:   Patent x2 ports. OK to use by IR. Labs   Hep B status / date: Pending   Obtained/Reviewed  Critical Results Called Reviewed  n/a     Meds Given   Name Dose Route   None                 Total Liters Process: 38.4   Net Fluid Removed: 0      Comments   Time Out Done: Yes   Primary Nurse Rpt Pre: Dio Mirza RN   Primary Nurse Rpt Post: Dio Mirza RN   Pt Education: Procdural   Care Plan: HD start as ordered, Educate re HDall    Tx Summary: Patient tolerated 2 hours of HD treatment well. All possible blood returned without any complications. Report given to Primary RN. Remain in bed sleeping and in stable condition.

## 2017-02-27 NOTE — PROGRESS NOTES
Bedside and Verbal shift change report given to [address] (oncoming nurse) by Eloina Kellogg RN (offgoing nurse). Report included the following information SBAR, Intake/Output and MAR.

## 2017-02-27 NOTE — PROGRESS NOTES
[facility] Gynecologic Oncology  [address] [facility] Lynsey Ivey, FARHAT    Patient Name: Renny Lacy  MRN: [id number]  AGE: 35 y.o.  : 1975    Date: 2017    Date of Admission: 2017    Unit: 361/01    Admission Diagnosis:    Metastatic cervical cancer    S/P CDDP/RT   Acute renal failure    Hydronephrosis  Problem List:     Patient Active Problem List   Diagnosis Code    Malignant neoplasm of endocervix ([facility] [address] C53.0    Anemia D64.9    Anxiety about health F41.8    HTN (hypertension) I10    ARF (acute renal failure) ([facility] [address] N17.9    Obesity, morbid, BMI 50 or higher ([facility] [address] E66.01    Radiation burn T30.0    Dehydration E86.0    Constipation K59.00    Nausea & vomiting R11.2    Weight loss, abnormal R63.4    Renal failure N19    Hydronephrosis N13.30         Subjective:   Pt had Kar cath placed yesterday with her first dialysis last evening. Creat significantly improved this morning. Nephrostomy tube draining clear, bloody fluid. Goldsmith draining bloody urine. Mother at side and supportive               Ongoing nursing care reviewed. DVT/pulmonary prophylaxis ongoing. No acute distress. Activity: OOB. Diet: ice chips   Nausea/vomiting: None. BM/flatus: passing flatus and had BM this morning    : Goldsmith indwelling with nephrostomy tube    Review of Systems:    Constitutional: Negative for fever, chills and diaphoresis. Respiratory: Negative for reported cough, shortness of breath and wheezing. Cardiovascular: Negative for reported chest pain and leg swelling. Gastrointestinal: Negative for nausea, vomiting. Neurological:  No acute deficit. Vitals:     Vitals:    17 0130 17 0200 17 0230 17 0300   BP: 125/64 122/75 128/76 125/81   Pulse: 64 64 67 71   Resp:       Temp:    97.6 °F (36.4 °C)   TempSrc:    Oral   SpO2:       Weight:       Height:            Body mass index is 42.38 kg/(m^2).   I/O:       Date 02/26/17 0700 - 02/27/17 0659 02/27/17 0700 - 02/28/17 0659   Shift 3005-0182 6027-4401 24 Hour Total 1337-9091 3212-4122 24 Hour Total   I  N  T  A  K  E   Shift Total  (mL/kg)         O  U  T  P  U  T   Urine  (mL/kg/hr) 725  (0.5) 1810  (1.2) 2535  (0.9)         Urine Output (mL) (Urinary Catheter 02/25/17 Goldsmith)          Urine Output (mL) (Nephrostomy Tube 02/26/17 Flank)        Dialysis  0 0         NET Fluid Removed (mL)  0 0       Shift Total  (mL/kg) 725  (6) 1810  (15) 2535  (21)      NET -725 -1810 -2535      Weight (kg) 120.9 120.9 120.9 120.9 120.9 120.9       Examination:   Gen: Conversant, alert, oriented. Slightly anxious about disease progression   Cardiac: Regular without M/R/G   Lungs: Clear to auscultation   Abdomen:    Obese, soft without tenderness. Nephrostomy tube in place to Right flank area. + BS   Extremities: No deep tenderness, warm, pulses appreciated, trace edema. MSE: Grossly intact    Labs: Laboratory chart enteries reviewed. Recent Results (from the past 24 hour(s))   RENAL FUNCTION PANEL    Collection Time: 02/27/17  4:13 AM   Result Value Ref Range    Sodium 138 136 - 145 mmol/L    Potassium 3.3 (L) 3.5 - 5.1 mmol/L    Chloride 101 97 - 108 mmol/L    CO2 28 21 - 32 mmol/L    Anion gap 9 5 - 15 mmol/L    Glucose 103 (H) 65 - 100 mg/dL    BUN 59 (H) 6 - 20 MG/DL    Creatinine 4.78 (H) 0.55 - 1.02 MG/DL    BUN/Creatinine ratio 12 12 - 20      GFR est AA 12 (L) >60 ml/min/1.73m2    GFR est non-AA 10 (L) >60 ml/min/1.73m2    Calcium 7.9 (L) 8.5 - 10.1 MG/DL    Phosphorus 3.5 2.6 - 4.7 MG/DL    Albumin 2.3 (L) 3.5 - 5.0 g/dL   . Impression/Plan:    Oncologic: Refractory metastatic cervical cancer. S/P IV CDDP/RT    Secondary hydronephrosis--resultant ARF     Pretreatment history of L.  Hydronephrosis and atrophic kidney    Cardiopulmonary: Stable   Heme/CV: Hemodynamically stable      Renal:  ARF---obstructive and/or CDDP nephrotoxicity    FEN/GI: No dysfunction. ID/Wound: Afebrile. .     Dispostion/Plan:   Diet: Will begin renal diet today   IVF: Supportive   Antibiotics: None   Analgesia: Adequate    Dr. Galina Oakley sat with pt and her mother and discussed situation at length (greater than 40 minutes). He discussed managing renal issue first, then that we need to re-evaluate the status of cancer. He discussed that repeating radiation is not an option and that they are limited in chemo treatments as they have low efficacy. The other issue discussed is that if her kidney's are not functioning well, it makes chemotherapy options limited. He did discussed that there is a possibility of it being within a year, but he had no way of truly knowing. Will have Stephen Dahl RN, MSN, CNS visit with pt today as she has been seeing pt on and off throughout her diagnosis and has been supportive. Pt has also requested to see if she can visit today    Bobbi Smith NP  2/27/2017  10:52 AM    Seen and agree with above. Long conversation with patient and her mother. Likely has progressive disease, but she could still have swelling from secondary to her radiation which is causing the hydronephrosis. Will need to reevaluate in the near future to reassess. If she does have progressive disease, I explained that her disease is not curable. She is not a surgical candidate and chemotherapy for cervical cancer is very ineffective. We will reevaluate after her renal function has improved with with dialysis and nephrostomy placement. She is already feeling a bit better.      Mainor Ball MD

## 2017-02-27 NOTE — PROGRESS NOTES
[facility]   [address] [facility], [address] [place] [address], [facility]  Phone: [phone]   [phone]       Nephrology Progress Note  Trinity Forbes     1975     [id number]  Date of Admission : 2/25/2017 02/27/17    CC: Follow up for ROOPA       Assessment and Plan   ROOPA :  - R sided Obstruction + Platin toxicity likely   - S/P HD x 1   - No HD today   - changed IVF from Bicarb gtt to Lactated Ringers     B/L Ureteral Obstruction 2/2 Stage IV Cervical Ca :  -Chronic L Hydro w/ cortical atrophy   - New R [place]- s/p PerC Neph , internal stent   - Appreciate IR help and Urology follow up     Metabolic acidosis :  - resolved      Stage IV cervical Ca      Interval History:  Seen and examined   Her pain is controlled   Perc Neph drained 2.5L   HD yesterday for 2 hrs     Review of Systems: Pertinent items are noted in HPI.     Current Medications:   Current Facility-Administered Medications   Medication Dose Route Frequency    prochlorperazine (COMPAZINE) with saline injection 10 mg  10 mg IntraVENous Q6H PRN    lactated ringers infusion  125 mL/hr IntraVENous CONTINUOUS    acetaminophen (TYLENOL) tablet 650 mg  650 mg Oral Q4H PRN    oxyCODONE-acetaminophen (PERCOCET) 5-325 mg per tablet 2 Tab  2 Tab Oral Q4H PRN    diphenoxylate-atropine (LOMOTIL) tablet 1 Tab  1 Tab Oral QID PRN    fentaNYL (DURAGESIC) 12 mcg/hr patch 1 Patch  1 Patch TransDERmal Q72H    morphine IR (MS IR) tablet 15 mg  15 mg Oral Q4H PRN    ondansetron (ZOFRAN ODT) tablet 4 mg  4 mg Oral Q8H PRN    venlafaxine (EFFEXOR) tablet 75 mg  75 mg Oral DAILY    sodium chloride (NS) flush 5-10 mL  5-10 mL IntraVENous Q8H    sodium chloride (NS) flush 5-10 mL  5-10 mL IntraVENous PRN    LORazepam (ATIVAN) injection 1 mg  1 mg IntraVENous Q6H PRN      Allergies   Allergen Reactions    Coumadin [Warfarin] Other (comments)     SEVERE ABDOMINAL CRAMPING       Objective:  Vitals:    Vitals:    02/27/17 0200 02/27/17 0230 02/27/17 0300 02/27/17 0931   BP: 122/75 128/76 125/81 139/90   Pulse: 64 67 71 98   Resp:    18   Temp:   97.6 °F (36.4 °C) 98.3 °F (36.8 °C)   TempSrc:   Oral    SpO2:    95%   Weight:       Height:         Intake and Output:  02/27 0701 - 02/27 1900  In: -   Out: 200 [Urine:200]  02/25 1901 - 02/27 0700  In: -   Out: 7603 [Urine:2535]    Physical Examination:    General: Obese, no distress  Neck:  Supple, no mass  Resp:  Lungs CTA B/L, no wheezing , normal respiratory effort  CV:  RRR,  no murmur or rub no LE edema  GI:  Soft, NT, + Bowel sounds, no hepatosplenomegaly  Neurologic:  Non focal  Psych:             AAO x 3 appropriate affect   Skin:  No Rash  :  Rupal , R Perc Neph - bloody urine     []    High complexity decision making was performed  []    Patient is at high-risk of decompensation with multiple organ involvement    Lab Data Personally Reviewed: I have reviewed all the pertinent labs, microbiology data and radiology studies during assessment.     Recent Labs      02/27/17   0413  02/26/17   0337  02/25/17   1525  02/24/17   1504   NA  138  136  134*  133*   K  3.3*  4.4  4.1  4.3   CL  101  105  103  102   CO2  28  15*  18*  16*   GLU  103*  87  78  81   BUN  59*  103*  104*  103*   CREA  4.78*  8.59*  8.12*  7.03*   CA  7.9*  8.4*  8.5  7.9*   PHOS  3.5  6.1*   --    --    ALB  2.3*  2.6*  2.7*   --    SGOT   --    --   9*   --    ALT   --    --   10*   --      Recent Labs      02/25/17   1525   WBC  5.5   HGB  9.1*   HCT  28.3*   PLT  351     No results found for: SDES  No results found for: CULT  Recent Results (from the past 24 hour(s))   RENAL FUNCTION PANEL    Collection Time: 02/27/17  4:13 AM   Result Value Ref Range    Sodium 138 136 - 145 mmol/L    Potassium 3.3 (L) 3.5 - 5.1 mmol/L    Chloride 101 97 - 108 mmol/L    CO2 28 21 - 32 mmol/L    Anion gap 9 5 - 15 mmol/L    Glucose 103 (H) 65 - 100 mg/dL    BUN 59 (H) 6 - 20 MG/DL    Creatinine 4.78 (H) 0.55 - 1.02 MG/DL    BUN/Creatinine ratio 12 12 - 20      GFR est AA 12 (L) >60 ml/min/1.73m2    GFR est non-AA 10 (L) >60 ml/min/1.73m2    Calcium 7.9 (L) 8.5 - 10.1 MG/DL    Phosphorus 3.5 2.6 - 4.7 MG/DL    Albumin 2.3 (L) 3.5 - 5.0 g/dL         I have reviewed the flowsheets. Chart and Pertinent Notes have been reviewed. No change in PMH ,family and social history from Consult note.       Fred Dawn MD

## 2017-02-27 NOTE — PROGRESS NOTES
Bedside shift change report given to [place], RN (oncoming nurse) by Liz Martínez RN (offgoing nurse). Report included the following information SBAR.

## 2017-02-28 NOTE — PROGRESS NOTES
[facility]   [address] [facility], [address] [place] [address] [address], [facility] [place]  Phone: [phone]   [phone]       Nephrology Progress Note  Mikki Parikh     1975     [id number]  Date of Admission : 2/25/2017 02/28/17    CC: Follow up for ROOPA       Assessment and Plan   ROOPA :  - R sided Obstruction +/- Platin toxicity likely   - S/P HD x 1   - No further HD --> remove Kar tomorrow. - continue LR at current rate   - lasix 20 mg IV x 1   - Ok for d/c tomorrow     B/L Ureteral Obstruction 2/2 Stage IV Cervical Ca :  -Chronic L Hydro w/ cortical atrophy   - New R [place]- s/p PerC Neph , internal stent   - Appreciate IR help and Urology follow up   - ordered Pyridium for bladder spasm     HypoK : 40 meq P.o Kcl     Metabolic acidosis : resolved      Stage IV Cervical Ca     Nausea/ Vomiting : defer to Gyn-Onc     Interval History:  Seen and examined   Doing well   Denies any CP/SOB  Nauseous and vomited yesterday x 2   Reports bladder spasms  UOP : good   No edema     Review of Systems: Pertinent items are noted in HPI.     Current Medications:   Current Facility-Administered Medications   Medication Dose Route Frequency    prochlorperazine (COMPAZINE) with saline injection 10 mg  10 mg IntraVENous Q6H PRN    lactated ringers infusion  125 mL/hr IntraVENous CONTINUOUS    oxybutynin chloride XL (DITROPAN XL) tablet 5 mg  5 mg Oral DAILY    acetaminophen (TYLENOL) tablet 650 mg  650 mg Oral Q4H PRN    oxyCODONE-acetaminophen (PERCOCET) 5-325 mg per tablet 2 Tab  2 Tab Oral Q4H PRN    diphenoxylate-atropine (LOMOTIL) tablet 1 Tab  1 Tab Oral QID PRN    fentaNYL (DURAGESIC) 12 mcg/hr patch 1 Patch  1 Patch TransDERmal Q72H    morphine IR (MS IR) tablet 15 mg  15 mg Oral Q4H PRN    ondansetron (ZOFRAN ODT) tablet 4 mg  4 mg Oral Q8H PRN    venlafaxine (EFFEXOR) tablet 75 mg  75 mg Oral DAILY    sodium chloride (NS) flush 5-10 mL  5-10 mL IntraVENous Q8H    sodium chloride (NS) flush 5-10 mL  5-10 mL IntraVENous PRN    LORazepam (ATIVAN) injection 1 mg  1 mg IntraVENous Q6H PRN      Allergies   Allergen Reactions    Coumadin [Warfarin] Other (comments)     SEVERE ABDOMINAL CRAMPING       Objective:  Vitals:    Vitals:    02/27/17 0300 02/27/17 0931 02/27/17 2015 02/28/17 0350   BP: 125/81 139/90 128/69 118/70   Pulse: 71 98 64 83   Resp:  18 20 18   Temp: 97.6 °F (36.4 °C) 98.3 °F (36.8 °C) 98 °F (36.7 °C) 98.1 °F (36.7 °C)   TempSrc: Oral      SpO2:  95% 99% 90%   Weight:       Height:         Intake and Output:  02/27 1901 - 02/28 0700  In: -   Out: 950 [Urine:950]  02/26 0701 - 02/27 1900  In: 2358.8 [P.O.:480; I.V.:1878.8]  Out: 3185 [Urine:3185]    Physical Examination:    General: Obese, no distress  Neck:  Supple, no mass  Resp:  Lungs CTA B/L, no wheezing , normal respiratory effort  CV:  RRR,  no murmur or rub no LE edema  GI:  Soft, NT, + Bowel sounds, no hepatosplenomegaly  Neurologic:  Non focal  Psych:             AAO x 3 appropriate affect   Skin:  No Rash  :  Goldsmith , R Perc Neph - bloody urine     []    High complexity decision making was performed  []    Patient is at high-risk of decompensation with multiple organ involvement    Lab Data Personally Reviewed: I have reviewed all the pertinent labs, microbiology data and radiology studies during assessment.     Recent Labs      02/28/17   0512  02/27/17   0413  02/26/17   0337  02/25/17   1525   NA  135*  138  136  134*   K  3.4*  3.3*  4.4  4.1   CL  97  101  105  103   CO2  30  28  15*  18*   GLU  106*  103*  87  78   BUN  51*  59*  103*  104*   CREA  4.86*  4.78*  8.59*  8.12*   CA  8.2*  7.9*  8.4*  8.5   MG  1.3*   --    --    --    PHOS   --   3.5  6.1*   --    ALB  2.4*  2.3*  2.6*  2.7*   SGOT  24   --    --   9*   ALT  11*   --    --   10*     Recent Labs      02/28/17   0512  02/25/17   1525   WBC  5.9  5.5   HGB  7.8*  9.1*   HCT  24.9*  28.3*   PLT  238  351     No results found for: SDES  No results found for: CULT  Recent Results (from the past 24 hour(s))   METABOLIC PANEL, COMPREHENSIVE    Collection Time: 02/28/17  5:12 AM   Result Value Ref Range    Sodium 135 (L) 136 - 145 mmol/L    Potassium 3.4 (L) 3.5 - 5.1 mmol/L    Chloride 97 97 - 108 mmol/L    CO2 30 21 - 32 mmol/L    Anion gap 8 5 - 15 mmol/L    Glucose 106 (H) 65 - 100 mg/dL    BUN 51 (H) 6 - 20 MG/DL    Creatinine 4.86 (H) 0.55 - 1.02 MG/DL    BUN/Creatinine ratio 10 (L) 12 - 20      GFR est AA 12 (L) >60 ml/min/1.73m2    GFR est non-AA 10 (L) >60 ml/min/1.73m2    Calcium 8.2 (L) 8.5 - 10.1 MG/DL    Bilirubin, total 0.6 0.2 - 1.0 MG/DL    ALT (SGPT) 11 (L) 12 - 78 U/L    AST (SGOT) 24 15 - 37 U/L    Alk. phosphatase 228 (H) 45 - 117 U/L    Protein, total 6.0 (L) 6.4 - 8.2 g/dL    Albumin 2.4 (L) 3.5 - 5.0 g/dL    Globulin 3.6 2.0 - 4.0 g/dL    A-G Ratio 0.7 (L) 1.1 - 2.2     MAGNESIUM    Collection Time: 02/28/17  5:12 AM   Result Value Ref Range    Magnesium 1.3 (L) 1.6 - 2.4 mg/dL   CBC WITH AUTOMATED DIFF    Collection Time: 02/28/17  5:12 AM   Result Value Ref Range    WBC 5.9 3.6 - 11.0 K/uL    RBC 3.08 (L) 3.80 - 5.20 M/uL    HGB 7.8 (L) 11.5 - 16.0 g/dL    HCT 24.9 (L) 35.0 - 47.0 %    MCV 80.8 80.0 - 99.0 FL    MCH 25.3 (L) 26.0 - 34.0 PG    MCHC 31.3 30.0 - 36.5 g/dL    RDW 19.5 (H) 11.5 - 14.5 %    PLATELET 171 914 - 836 K/uL    NEUTROPHILS PENDING %    LYMPHOCYTES PENDING %    MONOCYTES PENDING %    EOSINOPHILS PENDING %    BASOPHILS PENDING %    ABS. NEUTROPHILS PENDING K/UL    ABS. LYMPHOCYTES PENDING K/UL    ABS. MONOCYTES PENDING K/UL    ABS. EOSINOPHILS PENDING K/UL    ABS. BASOPHILS PENDING K/UL    DF PENDING          I have reviewed the flowsheets. Chart and Pertinent Notes have been reviewed. No change in PMH ,family and social history from Consult note.       Omega Anne MD

## 2017-02-28 NOTE — PROGRESS NOTES
ONCOLOGY NURSE NAVIGATOR    Supportive visit at bedside with Harry Inman alone -- \"the first time mom has left me\"  [facility] is appreciating the quiet and the opportunity for rest  Likes knowing that mom is resting at home    \"Oh, I am so scared. \"  Describes events since admission as she understands them  [facility] states she appreciates direct info from providers and trusts Dr. Amado Hernadez and Juliane Zhang, FARHAT . Understands her cancer is not curable and will shorten her life  Fears she may not live longer than a year    She wants to hope for the best and wonders if her kidneys could get better so she could get more chemo  However, understands and knows she must plan for the worst  She believes in a \"higher power\" but recalls that at age 9 she felt God was absent at the time her father     She frankly talked about the challenges she and mom have with ADHD  They are easily overwhelmed and can get off track and disorganized with follow through  Courtney's cancer dx and treatment have stressed their relationship but she acknowledges they need each other    She would like to get stronger  Wants to attend yoga classes and have massage and is interested in relaxation and meditation therapy -- she wasn't well enough during treatment to try any of these supports  She and mom would like to travel    [facility] is alison about her financial concerns  She cannot continue to work, although if she feels well, she would like to volunteer at [employer]  The family car is in [address] [place]    Actions/plan  1. Empathic listening. Normalized and honored her fears. Enc ongoing alison conversation with her [facility] team so she can accomplish ACP and focus on QOL. 2.  Talked about how to process unwanted news like this and acknowledged the uncertainties of living with cancer.   We talked about the need to look at the big picture but perhaps a way to approach moving ahead right now is to look at trying to improve and feel better on a day by day basis for a while  3. Re: financial concerns   A. Complete care card application -- I'll assist   B.  I referred/discussed with [facility] -- Nicky Caceres may be eligible for SSDI and then Medicaid. C.  Plan further assessment of financial straits and direct to resources as possible  4. Will facilitate scheduling with therapies and massage in [place], etc, as desired. These supports are available to mom, too. Will have Christine Clayton, patient advocate in [facility], introduce self to Nicky Caceres if possible before discharge    5. Rec sensitivity to ADHD challenges and deliver and pace info in way Nicky Caceres and mom can tolerate. 6.  Will offer assist with completing AMD  7.   If Nicky Caceres is amenable, will suggest she consider seeing [facility] counselor through grief process    Appreciative of the visit    Joshua King MS RN AOCNS

## 2017-02-28 NOTE — PROGRESS NOTES
Pt still complaining of bladder spasms, states has gotten a bit better but still present, mostly with movement.
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
## 2017-03-01 NOTE — PROGRESS NOTES
Bedside shift change report given to Jackie Cisneros RN (oncoming nurse) by [facility], RN (offgoing nurse). Report included the following information SBAR.

## 2017-03-01 NOTE — PROGRESS NOTES
Ok to dc ron. Leave neph tube draining until creat allows for clamping (may need to keep neph tube given slow resolution of ARF.

## 2017-03-01 NOTE — DISCHARGE INSTRUCTIONS
[facility]ECOLOG[facility]  [address], [facility] Estrellita Recinos  [phone]         [phone]    MD Analilia Reyes MD Dianne E. Ennis Olmsted, NP     Patient Discharge Instructions          Ko Baker / [id number] : 1975    Admit Date: 2017 Discharge Date: 3/1/2017     Take Home Medications     See Discharge Medication Review provided to you by your nurse. If you did not receive one, request this prior to your discharge. · It is important that you take your medications as they are prescribed. · Keep your medications in the bottles provided by the pharmacist and keep a list of the medication names, dosages, times to be taken and what they are for in your wallet. · Do not take other medications without consulting your doctor. · If you no longer need your prescribed pain medication, you may take Tylenol alone. Due to your kidney function, please avoid any ibuprofen   · You should take a daily gentle stool softener such as a colace pill or dulcolax suppository for If constipation persists for >24 hours you should take a dose of Milk of Magnesia. Call if your constipation continues. Diet    · Stay hydrated with fluids such as gatorade and water. This will also help prevent constipation. Limit somewhat any usual caffeine intake of beverages such as soda, tea and coffee as this may serve to dehydrate you. · If nauseated, keep your diet limited to liquids and call if this persists. Activity    · Gradually increase your activity each day. There are generally no restrictions on walking, climbing stairs or riding in a car. Try to walk at least 6 times per day. · Showers are okay. If you have an incision, no tub bathing/swimming for two weeks.  You will need to keep the dressing over your nephrostomy tube covered with plastic wrap and tape to keep the area dry while you take a quick shower and avoid water going directly over the dsg  · No driving while on pain medication. · There are no lifting restrictions since you did not have surgery. Causes For Concern    If any of the following occur, please call our office and speak with the Nurse/aid who will help you with your problem or ask the doctor to call you. Problems with the incision/nephrostomy tube site, including increasing pain, swelling, redness or drainage. Persisting nausea or vomiting. Fever or chills and a temperature >100.4F  Constipation (no bowel movement for three days). Diarrhea (more than three watery stools within 24 hours). Excessive bloody urine  If your nephrostomy tube output decreases significantly or stops all together, please go the ER directly  If after hours and you cannot reach an on-call physician, call 911. Follow-Up    You will need to have your labs drawn next Monday, 3/6 at the [facility] in the [facility] 3rd floor  Once we have these results, we will contact you as to when you need to be seen by our office and Dr. Abby Dunaway, (it will depend on the lab results)        Information obtained by :  I understand that if any problems occur once I am at home I am to contact my physician. I understand and acknowledge receipt of the instructions indicated above.                                                                                                                                            Physician's or R.N.'s Signature                                                                  Date/Time                                                                                                                                              Patient or Representative Signature                                                          Date/Time

## 2017-03-01 NOTE — PROGRESS NOTES
[facility]   [address], [address], [facility]  Phone: [phone]   [phone]       Nephrology Progress Note  Scooter Rivera     1975     [id number]  Date of Admission : 2/25/2017 03/01/17    CC: Follow up for ROOPA       Assessment and Plan   ROOPA :  - R sided Obstruction +/- Platin toxicity likely   - S/P HD x 1 . Remove chon -- PICC team /IR   - stop IVF   - [id number] [address] for d/c today   - labs on Monday next week and f/u with me   - No diuretics on d/c     B/L Ureteral Obstruction 2/2 Stage IV Cervical Ca :  -Chronic L Hydro w/ cortical atrophy   - New R [place]- s/p PerC Neph , internal stent   - Orn stopped draining --- > but draining through perc Neph   - can remove ron if urology [id number] [address] with it   - Advised to come to ER if Perc Neph out put stops, or has pain or fevers     HypoK : better    Metabolic acidosis : resolved      Stage IV Cervical Ca     Anemia : s/p transfusions      Interval History: Ron not draining   Good UOP through PCN   Cr down   2 unit of blood and Hb > 9 now   No N/V  Has bladder spasms     Review of Systems: Pertinent items are noted in HPI.     Current Medications:   Current Facility-Administered Medications   Medication Dose Route Frequency    phenazopyridine (PYRIDIUM) tablet 100 mg  100 mg Oral TIDPC    0.9% sodium chloride infusion 250 mL  250 mL IntraVENous PRN    metoclopramide HCl (REGLAN) injection 10 mg  10 mg IntraVENous Q6H    oxybutynin chloride XL (DITROPAN XL) tablet 5 mg  5 mg Oral DAILY    acetaminophen (TYLENOL) tablet 650 mg  650 mg Oral Q4H PRN    oxyCODONE-acetaminophen (PERCOCET) 5-325 mg per tablet 2 Tab  2 Tab Oral Q4H PRN    diphenoxylate-atropine (LOMOTIL) tablet 1 Tab  1 Tab Oral QID PRN    fentaNYL (DURAGESIC) 12 mcg/hr patch 1 Patch  1 Patch TransDERmal Q72H    morphine IR (MS IR) tablet 15 mg  15 mg Oral Q4H PRN    ondansetron (ZOFRAN ODT) tablet 4 mg  4 mg Oral Q8H PRN    venlafaxine (EFFEXOR) tablet 75 mg 75 mg Oral DAILY    sodium chloride (NS) flush 5-10 mL  5-10 mL IntraVENous Q8H    sodium chloride (NS) flush 5-10 mL  5-10 mL IntraVENous PRN    LORazepam (ATIVAN) injection 1 mg  1 mg IntraVENous Q6H PRN      Allergies   Allergen Reactions    Coumadin [Warfarin] Other (comments)     SEVERE ABDOMINAL CRAMPING       Objective:  Vitals:    Vitals:    02/28/17 2217 02/28/17 2245 02/28/17 2349 03/01/17 0226   BP: 121/73 125/68 119/69 141/87   Pulse: 84 80 79    Resp: 18 18 18 17   Temp: 98.6 °F (37 °C) 98.2 °F (36.8 °C) 98.1 °F (36.7 °C) 98.3 °F (36.8 °C)   TempSrc:       SpO2: 94% 94% 95% 93%   Weight:       Height:         Intake and Output:     02/27 1901 - 03/01 0700  In: 1994.5 [P.O.:440; I.V.:859.5]  Out: 3000 [Urine:3000]    Physical Examination:    General: Obese, no distress  Neck:  Supple, no mass  Resp:  Lungs CTA B/L, no wheezing , normal respiratory effort  CV:  RRR,  no murmur or rub no LE edema  GI:  Soft, NT, + Bowel sounds, no hepatosplenomegaly  Neurologic:  Non focal  Psych:             AAO x 3 appropriate affect   Skin:  No Rash  :  Goldsmith , R Perc Neph - bloody urine     []    High complexity decision making was performed  []    Patient is at high-risk of decompensation with multiple organ involvement    Lab Data Personally Reviewed: I have reviewed all the pertinent labs, microbiology data and radiology studies during assessment.     Recent Labs      03/01/17   0350  02/28/17   0512  02/27/17   0413   NA  134*  135*  138   K  3.7  3.4*  3.3*   CL  96*  97  101   CO2  28  30  28   GLU  92  106*  103*   BUN  50*  51*  59*   CREA  4.26*  4.86*  4.78*   CA  8.4*  8.2*  7.9*   MG  1.1*  1.3*   --    PHOS  4.1   --   3.5   ALB  2.6*  2.4*  2.3*   SGOT  22  24   --    ALT  11*  11*   --      Recent Labs      03/01/17   0350  02/28/17   0512   WBC  6.1  5.9   HGB  9.3*  7.8*   HCT  29.1*  24.9*   PLT  164  238     No results found for: SDES  No results found for: CULT  Recent Results (from the past 24 hour(s))   TYPE & CROSSMATCH    Collection Time: 02/28/17 12:31 PM   Result Value Ref Range    Crossmatch Expiration 03/03/2017     ABO/Rh(D) O POSITIVE     Antibody screen NEG     Unit number [id number]     Blood component type RC LR AS3,2     Unit division 00     Status of unit TRANSFUSED     Crossmatch result Compatible     Unit number [id number]     Blood component type RC LR AS1     Unit division 00     Status of unit TRANSFUSED     Crossmatch result Compatible    METABOLIC PANEL, COMPREHENSIVE    Collection Time: 03/01/17  3:50 AM   Result Value Ref Range    Sodium 134 (L) 136 - 145 mmol/L    Potassium 3.7 3.5 - 5.1 mmol/L    Chloride 96 (L) 97 - 108 mmol/L    CO2 28 21 - 32 mmol/L    Anion gap 10 5 - 15 mmol/L    Glucose 92 65 - 100 mg/dL    BUN 50 (H) 6 - 20 MG/DL    Creatinine 4.26 (H) 0.55 - 1.02 MG/DL    BUN/Creatinine ratio 12 12 - 20      GFR est AA 14 (L) >60 ml/min/1.73m2    GFR est non-AA 11 (L) >60 ml/min/1.73m2    Calcium 8.4 (L) 8.5 - 10.1 MG/DL    Bilirubin, total 1.0 0.2 - 1.0 MG/DL    ALT (SGPT) 11 (L) 12 - 78 U/L    AST (SGOT) 22 15 - 37 U/L    Alk. phosphatase 253 (H) 45 - 117 U/L    Protein, total 6.0 (L) 6.4 - 8.2 g/dL    Albumin 2.6 (L) 3.5 - 5.0 g/dL    Globulin 3.4 2.0 - 4.0 g/dL    A-G Ratio 0.8 (L) 1.1 - 2.2     MAGNESIUM    Collection Time: 03/01/17  3:50 AM   Result Value Ref Range    Magnesium 1.1 (L) 1.6 - 2.4 mg/dL   CBC WITH AUTOMATED DIFF    Collection Time: 03/01/17  3:50 AM   Result Value Ref Range    WBC 6.1 3.6 - 11.0 K/uL    RBC 3.54 (L) 3.80 - 5.20 M/uL    HGB 9.3 (L) 11.5 - 16.0 g/dL    HCT 29.1 (L) 35.0 - 47.0 %    MCV 82.2 80.0 - 99.0 FL    MCH 26.3 26.0 - 34.0 PG    MCHC 32.0 30.0 - 36.5 g/dL    RDW 18.2 (H) 11.5 - 14.5 %    PLATELET 880 272 - 577 K/uL    NEUTROPHILS 79 (H) 32 - 75 %    LYMPHOCYTES 9 (L) 12 - 49 %    MONOCYTES 11 5 - 13 %    EOSINOPHILS 1 0 - 7 %    BASOPHILS 0 0 - 1 %    ABS. NEUTROPHILS 4.8 1.8 - 8.0 K/UL    ABS.  LYMPHOCYTES 0.5 (L) 0.8 - 3.5 K/UL    ABS. MONOCYTES 0.7 0.0 - 1.0 K/UL    ABS. EOSINOPHILS 0.1 0.0 - 0.4 K/UL    ABS. BASOPHILS 0.0 0.0 - 0.1 K/UL   PHOSPHORUS    Collection Time: 03/01/17  3:50 AM   Result Value Ref Range    Phosphorus 4.1 2.6 - 4.7 MG/DL         I have reviewed the flowsheets. Chart and Pertinent Notes have been reviewed. No change in PMH ,family and social history from Consult note.       Cj Garsia MD

## 2017-03-01 NOTE — PROGRESS NOTES
Order received for home health. Note health insurance. Contacted several agencies re: SN need. [facility] agreed to accept but can plan to open on Friday. Verified with NP who agreed with time frame. Pt aware and agrees with plan.   (home health: [address])  Phu Cho LCSW
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
## 2017-03-01 NOTE — DISCHARGE SUMMARY
[facility] [address] [facility]   Discharge Summary        Patient ID:  Marylen Peak  [id number]  54 y.o.  1975    Admit Date: 2/25/2017    Discharge Date: 3/1/2017    Admission Diagnoses: Renal Failure  Renal failure    Discharge Diagnoses: Active Problems:    Renal failure (2/25/2017)      Hydronephrosis (2/26/2017)         Admission Condition: Poor    Discharge Condition: Good    Last Procedure: Placement of nephrostomy tube; insertion of Kar cath; dialysis     Hospital Course:   See daily notes for details of hospitalization;  Briefly, this is a 35 yr old female with known history of stage 4 cervical cancer S/P Chemoradiation with Cisplatin X7 cycles (begining 11/28 and ending 1/26). She had a know poor left kidney prior to initiating any treatment. Towards the end of her treatment, her creat began to rise. It was being monitored as outpt and we were going to refer to renal if it continued to rise. Prior to next lab check, pt was beginning to feel weak and not able to eat. We brought her to [facility] and had labs drawn and hydration with 2 liters of fluid. When her labs resulted, she was found to have a creat of 7.08 and BUN of 103. She was admitted and renal and  consult were obtained. Ron was placed  On 2/26, she had a Quintion cath placed and late that night into early hours of 2/27, she was dialyzed. The next day her creat had decreased from 8.59 down to 4.78. Her ron and nephrostomy tube continued to drain well. Per Renal, further dialysis was held and pt was feeling better. On Monday 2/27, Dr. Bassam Ross had a long discussion with pt and her mother about progression of disease and limited options with treatment. After this, pt was assisted by Nurse Navigator, Landy Ozuna, RN, MSN, CNS with availability of counseling and financial assistance going forward. On Tuesday, she had some nausea with eating and was having difficulty keeping food down.  Reglan wasbegun and this helped with eating and no further nausea    On day of discharge, pt was feeling better, able to eat without nausea and ron was removed. Long discussion with pt about discussion of follow up plan with written instructions reviewed   She was encouraged to call for any concerns or questions and we will be calling her at the end of the week for verbal follow up and she knows she can let us know if she needs to be seen sooner than next week. Labs will be checked on Monday 3/6 as out pt. Home health will follow up with pt at home for help with nephrostomy tube if needed      Consults: Nephrology;     Disposition: home    Patient Instructions:     Diet: Renal  If you have cramps or nausea, try eating smaller light meals more frequently. Wound Care: Keep clean and dry     Follow-up with labs next Monday, 3/6 and once labs are resulted, we will determine as when she needs to be seen, If creat stable, Dr. Pratibha Almaraz will see her that week, if stable or improved, he will see her the next week. We will follow up with her in 2 week or sooner if needed.    We are located at [facility] in the [address] [place] [address], [address] [facility], [address] [place], [place] [address]y [address] [phone]      Signed:  Elbert Bains NP  3/1/2017

## 2017-03-01 NOTE — PROGRESS NOTES
Confirmed with Dr. Jenn Morales to remove [facility] MEDICINE cath. Vascular access team  proceed with removal as ordered.

## 2017-03-01 NOTE — PROGRESS NOTES
Right neck dialysis catheter removed as ordered following hospital policy. Patient shows no signs no symptoms or complication. She did vomit 300cc foul smelling liquid. Hob up 30 degrees for comfort. Nausea resolved. On bedrest unit 1350. Report to [facility]. Refer to doc flow sheet.

## 2017-03-01 NOTE — PROGRESS NOTES
ONCOLOGY NURSE NAVIGATOR    Supportive visit with [place] -- follow up from yesterday's visit    Introduced her to [facility] patient advocate  Jhonathan sanders knows how to contact and can call Nishi Tamayo to schedule appts for integrative resources    [place] tells me she has completed and mailed care card application on 6/72    I've asked [employer] to review Courtney's case for SSDI/medicaid benefit eligibility    Gave her NCI's \"coping with advanced cancer\"    She's anticipating discharge home today  Feeling better and very bored  Has intention to follow up with Dr. Markus Meza and nephrology as directed after discharge    Will follow    Sharon Salinas MS RN AOCNS

## 2017-03-06 NOTE — PROGRESS NOTES
Pt called to say she had her labs drawn this morning and that she is almost out of her pain medication asking if she could have a refill while she was here at the hospital.  She is having pain in her back and abdomen and difficulty keeping food down. She said she is taking the Reglan and that she is trying small frequent meals. Says she has a lot of abd bloating and makes her nauseated. When I reviewed her foods, it turns out she says she has been able to keep \"onions and cucumbers down\". I explained that these both could cause gas build up and that sticking to low roughage diet would be better. She will continue the Reglan and stick to low roughage diet. She is also \"slightly constipated\" and I explained this too could cause her nausea and she would benefit from either a dulcolax supp or Miralax. She said she would try the Miralax and let us know tomorrow how she is doing. I will review her labs once they have resulted and if creat elevated from discharge, will have her see Dr. Teodoro Harry this week, otherwise, will re-check next week and she will follow up with both our office and Dr. Teodoro Harry at that point. She was encouraged to call for any concerns or questions.

## 2017-03-10 NOTE — ED NOTES
Bedside and Verbal shift change report given to Shaista Jimenez (oncoming nurse) by Lakshmi Coombs RN (offgoing nurse). Report included the following information SBAR, ED Summary.

## 2017-03-10 NOTE — PROCEDURES
Interventional Radiology  Procedure Note        3/10/2017 5:15 PM    Patient: Alexys Robles     Informed consent obtained    Diagnosis: Right nephrostomy tube leakage    Procedure(s): Pre-existing right PCN was nearly entirely outside the kidney, accounting for leakage and lack of output. Successful exchange and placement of a new right 10 F nephrostomy tube.      Specimens removed:  None     Complications: None    Primary Physician: Jai Benson MD    Recomendations: N/A    Discharge Disposition: ER    Full dictated report to follow    Signed By: Jai Benson MD

## 2017-03-10 NOTE — PROGRESS NOTES
1630: pt to angio holding for nephrostomy tube change   1700: procedure start  1713: procedure end  1730: TRANSFER - OUT REPORT:    Verbal report given to Shaista Jimenez on [employer]  being transferred to ED for routine progression of care       Report consisted of patients Situation, Background, Assessment and   Recommendations(SBAR). Information from the following report(s) SBAR, Procedure Summary and MAR was reviewed with the receiving nurse. Lines:   Venous Access Device (Active)       Peripheral IV 02/25/17 Right Arm (Active)       Peripheral IV 02/28/17 Right;Mid Arm (Active)       Peripheral IV 03/10/17 Right Antecubital (Active)   Site Assessment Clean, dry, & intact 3/10/2017  4:28 PM   Phlebitis Assessment 0 3/10/2017  4:28 PM   Infiltration Assessment 0 3/10/2017  4:28 PM   Dressing Status Clean, dry, & intact 3/10/2017  4:28 PM        Opportunity for questions and clarification was provided.       Patient transported with:   Registered Nurse

## 2017-03-10 NOTE — CONSULTS
Brief Note :  Called by Dr Caleb Cabrera about PCN not draining and bag empty   Pt reported fluid / urine leaking from the site   No fevers, [place], vomiting , pain reported   Advised to call IR for PCN position check   Please call if any concerns arise       Jairo Martinez MD, [facility].   Office :[phone]  Fax: [phone]

## 2017-03-10 NOTE — ED PROVIDER NOTES
HPI Comments: 35 y.o. female with past medical history significant for HTN, SOB,  who presents from home with chief complaint of post OP complication. Pt states that she had surgery for placement of a nephrostomy tube in her right flank last Friday (7 days ago) and noticed it leaking this morning. She states that she received the tube due to a blockage caused by her cervical cancer. She reports that her bag was last emptied this morning at 0600. Pt has chronic abdominal pain. Pt denies having any fever or vomiting today but complains of having multiple episodes of vomiting regularly due to chemotherapy for her cervical CA. There are no other acute medical concerns at this time. Social hx: former smoker, no EtOH use, no drug use  PCP: Femi Kelly MD    Note written by Jasmin Harkins, as dictated by Harley Crain MD 3:15 PM      The history is provided by the patient. No  was used. Past Medical History:   Diagnosis Date    ADD (attention deficit disorder)     Cancer (HCC)     CERVICAL    Chronic pain     KNEES    GERD (gastroesophageal reflux disease)     History of panic attacks     Hypertension     Ill-defined condition 2014    HX HIVES CONJUCTIVA EYES PER PATIENT    Morbid obesity ([address]     SOB (shortness of breath)     SOB WITH EXERTION       Past Surgical History:   Procedure Laterality Date    HX GYN      CKC,     HX HEENT      WISDOM TEETH    HX VASCULAR ACCESS      PICC line RT AC         Family History:   Problem Relation Age of Onset    Anxiety Mother     Attention Deficit Disorder Mother     Depression Mother     Cancer Father      LUNG    Cancer Maternal Grandmother      COLON    Anesth Problems Neg Hx        Social History     Social History    Marital status: SINGLE     Spouse name: N/A    Number of children: N/A    Years of education: N/A     Occupational History    Not on file.      Social History Main Topics    Smoking status: Former Smoker     Quit date: 11/3/2014    Smokeless tobacco: Never Used    Alcohol use No    Drug use: No    Sexual activity: Not on file     Other Topics Concern    Not on file     Social History Narrative         ALLERGIES: Coumadin [warfarin]    Review of Systems   Constitutional: Negative for fever. Eyes: Negative for visual disturbance. Respiratory: Negative for cough, shortness of breath and wheezing. Cardiovascular: Negative for chest pain and leg swelling. Gastrointestinal: Positive for abdominal pain. Negative for diarrhea, nausea and vomiting. Genitourinary: Negative for dysuria. Musculoskeletal: Negative. Negative for back pain and neck stiffness. Skin: Negative for rash. Neurological: Negative. Negative for syncope and headaches. Psychiatric/Behavioral: Negative for confusion. All other systems reviewed and are negative. Vitals:    03/10/17 1246 03/10/17 1517 03/10/17 1518   BP: 101/79 113/57    Pulse: 94     Resp: 18     Temp: 98.5 °F (36.9 °C)     SpO2: 97% 95% 96%   Weight: 117.5 kg (259 lb)     Height: 5' 6\" (1.676 m)              Physical Exam   Constitutional: She appears well-developed and well-nourished. No distress. HENT:   Head: Normocephalic. Eyes: Pupils are equal, round, and reactive to light. Neck: Normal range of motion. Cardiovascular: Normal rate and regular rhythm. No murmur heard. Pulmonary/Chest: Effort normal and breath sounds normal. No respiratory distress. Abdominal: Soft. There is no tenderness. Nephrostomy tube sutured in place, no current leakage. Musculoskeletal: Normal range of motion. She exhibits no edema. Neurological: She is alert. She has normal strength. No cranial nerve deficit. Skin: Skin is warm and dry. Psychiatric: She has a normal mood and affect. Her behavior is normal.   Nursing note and vitals reviewed.   Note written by Jasmin Ludwig, as dictated by Alexander Lambert MD 3:15 PM    [facility]  ED Course Procedures  CONSULT NOTE:  4:04 PM Tian Vicente MD spoke with Dr. Susana Curry MD, Consult for Nephrology. Discussed available diagnostic tests and clinical findings. He is in agreement with care plans as outlined. Dr. Glendy Conner states that IR put tube in 1.5 weeks ago. Based on patients symptoms, Dr. Glendy Conner thinks tube needs to be replaced and suggests speaking to IR.    CONSULT NOTE:  4:10 PM Tian Vicente MD spoke with Dr. Bradley Clay, Consult for IR. Discussed available diagnostic tests and clinical findings. He is in agreement with care plans as outlined. Dr. Lilia Galindo will see the patient. PROGRESS NOTE:  5:36 PM Pt was taken to IR to receive nephrostomy tube replacement. Tube is now draining appropriately. PROGRESS NOTE:  5:45 PM Urine shows possible UTI, will prescribe abx.

## 2017-03-10 NOTE — ED TRIAGE NOTES
Pt arrives with leaking nephrostomy tube to R flank. Reports having tube placed on Friday & now it needs to be \"restiched\".

## 2017-03-13 NOTE — PROGRESS NOTES
Pt called to say she was in the ER this weekend and had to have her PNT replaced. She was placed on Keflex for infection of the tube. She says she is constipated again and neither Miralax or MOM have helped. She feels \"like I need to go, but can not and it is making me nauseated\"  She feel the antinausea pills are making her constipated  Pt will try to re-start the Reglan Q12 with Compazine and hold off on the Zofran (feels this works better)  She will give herself a Fleet's enema today and see if that is effective. If not, she will call back.    Pt is concerned with the fact she applied for the care card in Jan and has not been able to obtain weather or not it is complete  I will discuss with our nurse navigator and also the possibility of her applying for Medicaid since she has a terminal disease, that she should be eligible   She will call us back tomorrow to let us know how she is doing

## 2017-03-16 NOTE — PROGRESS NOTES
ONCOLOGY NURSE NAVIGATOR    Called to see patient in [facility] is well known to me  She's here today for massag  With her Judy Merino  Arrived late as they got lost on [facility]   Courtney's mom, Dionna Gonzalez, is out of town and in her absence Ilsa Spear is staying with Yamile Major arrives in wheelchair  She's hoping to feel relaxed with massage and wants arms, shoulders and hands massaged only   She describes pain at right PNT site and has lower abd/pelvic pain  Describes poorly controlled nausea and vomits almost everything she tries to eat  Plagued with dry mouth, constantly sipping water for relief  Had Fleet's enema yesterday with results but is very frightened about getting constipated again  Says she's not taking anything for pain and on review of chart, she has several analgesics ordered but it's unclear to me that she's on a consistent pain regimen  My Santos says she has percocet at home but not taking it because of her fear of constipation  She also will not take zofran due to constipating SE  Says she's been directed not to take reglan \"ever again\" (but this does not jibe with JERAD Stacy note from 3/13)  Says she has other meds like promethazine that she can take  Does not take meds to prevent constipation at present    She says she's getting around independently at home   [id number] [facility] is involved for PNT care  Judy Merino reports My Santos sleeps a great deal of the time  My Santos and Ilsa Spear were out of the house yesterday for 5 hours -- shopping in [employer] with motorized scooter  My Santos would like to get out of house more often  She clearly states her goals are to feel better so she can get out of house for short trips and errands (read in [place] [employer], shop, visit)  States she wants doctors to be clear with her about her estimated LOS  This is an emotional subject. Ilsa Spear brought up idea of hospice services helping My Santos.   My Santos can discuss shortly but then says \"I have to move from this topic you all\"  She \"desperately needs to see my shrink\" and has appt next week -- Tiera Ochoa says she's been referred to 'the shrink\" and this will be her first visit    Marta Phillip identifies that Tiera Ochoa could use help with personal care and grooming   Says Tiera Ochoa could use a comfortable chair as it's either lay on couch propped up or in bed  Seeks ideas about DME at home that could assist Tiera Ochoa, like reclining chair as well as chux and linens  Tiera Ochoa seeks way to camouflage and secure PNT drainage bag in public  Says bag leaks occasionally  Marta Phillip states that [employer], Ova Colace, will not likely help Tiera Ochoa get out of house much other than to med appts  Tiera Ochoa is respectful of her mom's actions and appreciative of her care and verbalizes insight about her care burden  They increase each other's anxiety levels, she says    Tiera Ochoa has appt on 3/21 with Dr. Naomi Guerra    Actions/plan --  Empathic listening followed by delineation of identified concerns:  1. Will discuss pain and nausea and bowel mgt issues with CGO. Have a call in to Saida Palafox RN and [address] NP  2. Care needs:  I'll contact [facility] nursing to enc eval of med mgt and home safety/need for assistance with ADL/need for DME. Request  help with suggestions to enhance Courtney's confidence in public with drain (I gave her a print cloth bag to use to secure to waist of pants.)  Marta Phillip plans to explore affordable DME assists through [employer]. 3.  Empathic listening and discussion r/t grief:  Tiera Ochoa looks to meeting with Dr. Naomi Guerra on 3/21 as opportunity to discuss EOL issues. She understands she may not live long and would like idea of how long that could be (weeks, months, years?)  She is open to discussion about hospice and palliative care   4. Recommend palliative care consult for ACP and symptom management. Tiera Ochoa says she may self refer (has a brochure she received in hospital).   5.  Transportation barriers:  Tiera Ochoa would like autonomy to get out of house independent of mom when desired. Discussed for pay services and she has info/can self refer to [facility]. 6.  She's applied for SSDI and medicaid. Has care card and states relief and gratitude. 7.  [facility] binder as she needs way to organize appts. Discussed and enc keeping log of pain, nausea. 8.  Jim Mckeon plans to be more present for caregiving when she can but lives in [place]. Tj Moreira and offers to help Nelly Naranjo inform and solicit help from other family members. 5.  Nelly Naranjo would like to continue integrative supports like massage and relaxation therapy. Has appt with [facility] counselor next week. Will appreciate help from counselor to identify ways to [address] and family.     Will follow    Jona Humphries MS RN AOCNS

## 2017-03-17 NOTE — PROGRESS NOTES
Called pt to check on her and today, she says she is feeling better today. Nithya worked \"great\" and she now is having daily BM's and not loose and pt says nausea has improved. Instructed pt to bring ALL of her medications including non-prescription meds and we will go over all meds on Tuesday at her visit. She will call for any concerns or questions.
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
## 2017-03-21 NOTE — PROGRESS NOTES
One month follow up, the pt is no longer using the Fentanyl patch and would like a refill of the Flector patch. She complains of lower abdominal discomfort.

## 2017-03-21 NOTE — PROGRESS NOTES
After Dr. Giorgio Romeo met with pt and her mother, I met with them to review her medications. Pt now has a care card and is able to have medications filled at [facility] (here) and the care card covers the cost.   Currently she is on:  Tylenol 2 tabs Q4-6 hrs (stopped with new prescription of Percocet)  Adderrall  1 tab daily  Tramadol 50 mg Q6h prn  Morphine IR 15 mg (pt says does not work and does not take anymore) (Stopped today)  Flexeril 5 mg PRN (pt has not taken in months)  Compazine 10 mg (says she is not taking as does not work as well as Zofran)  Zofran 8 mg Q8h prn, but pt does take one each morning no matter what)  Kenny's prn  Gas X prn  Atenolol 25 mg daily  Effexor 75 mg BID  Stool softener  Miralax PRN (pt says she does not take often because she is afraid of diarrhea)  Probiotic daily  Iron supplement daily    We discussed her pain management and she stated she has found that Ritot 10/325 has been working the best for pain relief. She handed me the MS and asked me to dispose of them since she is not taking. I asked Abe Pritchett, pharmacist, to witness the disposal of the pills in the [id number] [facility] medication disposal bin and she did accompany me and witnessed the disposal of the MS IR bottle and pills (aprox 15 left). New Rx for percocet given to pt as well and a discussion of Reglan with new prescription, and that she will try to take it once or twice a day as she said this helped her before when she was taking it and will avoid the compazine with it . Discussed the Zofran and she will continue to take it every morning, but I reminded her that it could be taken 2 more time during the day as well. Samples of Ensure clear given to pt for try. Small, frequent meals encouraged and she will let us know how she is doing by next week or sooner if needed. Pt requested a referral to PT and I will place one for at least evaluation due to pt now being very deconditioned.     Lab slip given to pt for follow up labs and a check on a urine culture to make sure it is clear as pt is rather run down appearing today. Nephrostomy tube in place without swelling or tenderness. Pt continues to void. She will call for any concerns or questions, otherwise, she will call within a week to let us know when she has her labs drawn and to let us know how she is doing. Above discussed with Dr. Trinity Arreguin.   Angel Barcenas NP

## 2017-03-22 NOTE — TELEPHONE ENCOUNTER
50 min session with pt yesterday at [facility] at 12:20pm whom CARSON met a few months ago in [place]. Pt presented as lethargic, appropriately tearful at times and able to express her needs. In terms of insight to her illness, pt is aware that her life will be significantly shortened but reports \"I have no idea what kind of time I have. .\". Feels a trip to [place] [address] may be possible. Pt's primary concerns are her mom (especially watching her mom do chores where she cannot help and her lack of computer skills) and lack of bladder control. In terms of support, she has a friend Skyla (over 21 yrs history), her mom and Cameron Peabody. Pt seems to have a pretty enmeshed relationship with her mom ( reportedly a former care home psychologist) whom she seems to worry about the most even more than herself at times. Pt reports a history of ADD, anxiety and depression and has seen her PCP Dr. Cristhian Haro for antidepressant, ADD and antianxiety medicine (Effexor?, Adderall, and Demi Salvia). Of note pt seemed a little sedated in our session. Pt  Says there are days she feels that having a big party and then dying the next day would be easier on friends and family but would never act on this and has never had a plan. Explored things that bring the pt tj, she stated, \"Reading, Writing, Crafts and Bad Tv). She is spiritual with the earth and nature bringing her peace. Pt does not believe in [place] man who sits above the clouds making decisions\". Pt would like some suggested readings\websites for anticipatory grief and suggestions for books on daily meditation. Also requestd to meet 2 to 3 times a week. ROSEW explained that she could meet once a week and that if pt felt she needed more than a therapist outside of [facility] would be more appropriate. Plan-  - Suggested to pt and her mother that she see a psychiatrist to assess her depression and if not possible to see her PCP for med check (will discuss with Álvaro Anderson NP the concern as well).   CARSON has pt's email to send suggested reading and websites on requested topics.   Will follow up with pt next week 3/28/17 at 12:30pm

## 2017-03-28 NOTE — PROGRESS NOTES
Was called up to the [facility] to see Jadiel Conner who was meeting with the counselor, Yobany Campbell LCSW. Ann-Marie had asked Roney Cordero on her last visit to have a meeting with her mother so Roney Cordero could help her with endo of life concerns. (see Viktoriya's note for full details of that)  When I arrived, pt was tearful and stated \"this has been so helpful\". Pt and her mother had come to an agreement for Hospice. After much discussion, both feel this is the best options to allow Adams live \"a quality life of what is left\". I have placed two hospice consults in to visit pt and mother at home. [address]    While pt was here, she informed me that she was having \"some burning with urination\" and felt like she has a bladder infections. Nephrostomy tub remains in place and draining sarthak urine. Pt was sent for a UA and C&S and after this was obtained, she was begun on Levaquin.    She was also set up for hydration on Friday at [facility] in the [facility] for dehydration   Pt will call for any concerns or questions prior to Friday's apt

## 2017-03-30 NOTE — TELEPHONE ENCOUNTER
Pt's mother , Farhadfarhad De Oliveira, pt \"believes she slipped and bumped her head\", pt's mother Ernesto Trimblemine it a lymph node\", states she has a bump on the side of her head that is painful, pt's mother is requesting a call from [facility], she may be reached at [phone].

## 2017-03-30 NOTE — TELEPHONE ENCOUNTER
Late Entry- pt seen this past Tuesday 3/28/17. Was brought by wheelchair. Pt appeared exhausted and in discomfort. Mother asked if she need to come back for our session. Pt stated, \"Mom I am not ready for that yet\". Once in a room 1:1 with LCSW, pt tearful \"I am miserable, not sure how much more of this I can take\". Pt says watching her mother push herself to care for her and coming to all of her doctors appts are exhausting. When inquired about what would make life easier at this time she said losing the wound care nurse has been a huge burden and that her mom does not have the ability to bandage her properly. LCSW asked Wood Montiel pt advocate to raegan Moreno NP to touch base with pt regarding this issue. While waiting for Ben Isabel NP, pt continued to share her frustrations at home having her mom \"do it all by herself\" LCSW aware that Dr. Antonella Montesinos and Ben Isabel NP had talked with pt about Hospice based on life expectancy of 6 mths or less. LCSW asked what pt knew about their services and she seem to know minimal. Discussed that getting an information session may be helpful while the pt is alert and has some energy. Pt has expressed in both sessions concern about completing certain tasks or writing liters to loved ones is challenging due to energy and sedation from emds. Provided additional overview of hospice services and pt felt like this would be a huge relief for her and her mother. Pt then asked that LCSW get her mom and have her come to the session. Pt appropriately expressed herself and her wishes about exploring hospice. Mother tearful but supportive. Reminded mother that a major goal of hospice is to allow family to spend their time being a family and possibly improve quality of life for pt's time left vs exhausting each other by going to appts, pharmacy, etc. Both pt and mother agreed and open to learning more.  Ben Isabel NP, came in to session around 1:30pm. Pt shared her medical concerns and need to have home health wound care a little longer. Kacie Ramirez checked pt's bandage area and was also concerned about dehydration and possible UTI. Pt sent to [place] [address] by wheelchair for urinalysis and scheduled for Friday for IV fluids. Pt stated this session and discussion of exploring Hospice was a huge relief. Pt requested to come back for support next week. PT ambivalent on whether she could make massage on 3/29. If she is up to it, follow up on 4/4 at 11:30 am with ROSEW. Pt also provided online support info and recommended books on end of life as requested. Will continue to monitor and offer support as needed. Cristopher Dunn NP to place Hospice consult order for 2 agencies.     Sindy Downs LCSW

## 2017-04-03 NOTE — TELEPHONE ENCOUNTER
Momo Quintero, please call Kvng Lee, pt's mother, she states that Cony Messenger so sleepy during your conversation this morning\" she does not remember any it. Pt's mother is requesting a call at [phone] to get the information you discussed with pt this morning.

## 2017-04-05 NOTE — TELEPHONE ENCOUNTER
Called pt's mother back and she said pt is still not \"ready for a ron cath\", but is allowing depends and this has helped. Eleuterio Dai, (pt's mother) said she is getting \"very tired\". I suggested she discuss with hospice (she chose [facility]). I suggested that maybe they could offer some relief through volunteer's or if necessary, maybe a 2-3 day hospice respite in the hospital could be possible if Hospice deemed it necessary. Eleuterio Coon said she would talk to them and let me know. She said pt was sleeping currently and denied any problems with pain management at present.    She will call tomorrow to let us know how she is doing and if she has any questions

## 2017-04-07 NOTE — CERTIFICATE OF TERMINAL ILLNESS
Hospice Physician Admission Narrative   (Certification of Terminal Illness)    [facility]  Good Help to Those in Need  [phone]    [facility] ROSEMARIE MONTGOMERY terminal diagnosis:     Malignant neoplasm of endocervix ([address] (C53.0)  Other Hospice diagnoses:     Hydronephrosis, unspecified hydronephrosis type (N13.30)     Unspecified essential hypertension (I10)     Anxiety (F41.9)     Anemia, unspecified (D64.9)     Encounter for hospice care (Z51.5)    Benefit Period 1  Start Date: 4/1/2017  End Date: 6/29/2017     HOSPICE NARRATIVE COMPOSED BY PHYSICIAN   Rationale for a prognosis of life expectancy of 6 months or less if the disease follows its normal course:    Scooter Rivera is a 35y.o. year old who was admitted to [facility]. The patient's principle diagnosis of metastatic cervical cancer has resulted in decreased function, nausea, shortness of breath, and pain as well as severe fatigue and sedation. She had percutaneous nephrostomy tubes for acute renal failure from progressive disease. Functionally, the patient's Palliative Performance Scale has declined over a period of months and is estimated at 40. Objective information that support this patients limited prognosis includes: CT scan on 2/25/17. The patient/family chose comfort measures with the support of Hospice. CT on 2/25/17  IMPRESSION:   1. A heterogeneous mass /collection in the pelvis measures 7.8 cm in greatest diameter. This does not appear significantly changed in size when compared to the area of increased tracer activity from PET/CT on 11/18/2016 and is in keeping with the patient's history of cervical cancer.   2. Diffuse mesenteric edema and small pericardial effusion.     Attestation: I confirm that I composed this narrative as the physician and is based on my review of the patient's medical record and/or examination of the patient. ___________________________________________________________    The LCD for Hospice for the admitted diagnosis of cancer includes:    [] Disease with distant metastases at presentation OR    [x] Progression from an earlier stage of disease to metastatic disease with either:   [x] a continued decline in spite of therapy   [] patient declines further disease directed therapy    [x] Certain cancers with poor prognoses (e.g. small cell lung cancer, brain cancer and pancreatic cancer) may be hospice eligible without fulfilling the other criteria in this section.

## 2017-04-18 NOTE — PROGRESS NOTES
ONCOLOGY NURSE NAVIGATOR    Jenifer, Courtney's mother, left VM in CRC  Questions about correspondance she's received from \"Social Security\"  I've phoned [employer] to identify who Courtney's contact is for her SSDI -- Want to direct family to this contact.     Milena Pa MS RN AOCNS

## 2017-04-26 NOTE — TELEPHONE ENCOUNTER
Pt's mother, Gerhardt Fang called to office to state that since her first of methadone, her N&V has stopped, and she has had no pain (pt's mother wanted Shira Riggs NP to know)

## 2017-04-28 NOTE — PROGRESS NOTES
1650 Patient arrived to the [facility] via medical transport. Patient alert on arrival with confusion and restlessness. Dr Thu Zuleta and Glenna Dill aware of patient's arrival. Patient's mother and sister pregnant and educated on signs and symptoms of EOL. Patient has mottling to her toes bilaterally. Patient's mother verbalized understanding to teaching. Orders received from Dr. Thu Zuleta. 1750 Patient resistant during repositioning. 1850 Patient restless, waving her arms and pulling her clothes off. PRN dose of ativan and oxycodone administered. Pharmacy contacted in regards to patient's methadone. 1900 Report given to Chaz perez RN.

## 2017-04-29 PROCEDURE — 0655 HSPC INPATIENT RESPITE

## 2017-04-29 NOTE — HOSPICE
2135: Found pt unresponsive with no pulse or breathing. Pt pronounced at this time, family (mother) notified. She is on her way over to the [facility].

## 2017-04-29 NOTE — PROGRESS NOTES
1900: Shift report received from [facility].  1950: In to assess pt, she is laying in bed, showing signs of agitation, moving her head and arms and pulling on the covers. Her eyes are open but do not fixate on anything, she does not respond to her name. Lungs are clear but breathing is heavy and elevated at 42 bpm. Pt is very cold to the touch, her feet are showing signs mottling. BP, temp and HR can not be detected. Skin is intact. Nephrostomy tube is draining red urin. Since pt is showing the mentioned signs of pain and agitation and breathing is elevated, she is being medicated with PRN Oxycodone and Ativan, oral; as well as scheduled Methadone. 2030: Pt appears calmer, breathing had slowed down, looks comfortable. 2135: In to check on pt, no pulse or breathing noted, pt has passed away. Called mother to tell her pt has passed away.

## 2017-04-29 NOTE — H&P
[facility]  [phone]    Patient Name: Trevor Huston  YOB: 1975    Date of Provider Hospice Visit: 04/29/17    Level of Care:   [] General Inpatient (GIP)    [] Routine   [x] Respite    Location of Care:  [] [facility] [] [facility] [] [facility] [] [facility] [x] [facility]  [] Home [] Other:      Hospice terminal diagnosis:  Malignant neoplasm of endocervix ([address] (C53.0)  Other Hospice diagnoses:  Hydronephrosis, unspecified hydronephrosis type (N13.30)  Unspecified essential hypertension (I10)  Anxiety (F41.9)  Anemia, unspecified (D64.9)  Encounter for hospice care (Z51.5)     Benefit Period 1  Start Date: 4/1/2017  End Date: 6/29/2017     HOSPICE NARRATIVE COMPOSED BY PHYSICIAN   Rationale for a prognosis of life expectancy of 6 months or less if the disease follows its normal course:  Trevor Huston is a 35y.o. year old who was admitted to [address] [place] [address]. The patient's principle diagnosis of metastatic cervical cancer has resulted in decreased function, nausea, shortness of breath, and pain as well as severe fatigue and sedation. She had percutaneous nephrostomy tubes for acute renal failure from progressive disease. Functionally, the patient's Palliative Performance Scale has declined over a period of months and is estimated at 40. Objective information that support this patients limited prognosis includes: CT scan on 2/25/17. The patient/family chose comfort measures with the support of Hospice.     CT on 2/25/17  IMPRESSION:   1. A heterogeneous mass /collection in the pelvis measures 7.8 cm in greatest diameter. This does not appear significantly changed in size when compared to the area of increased tracer activity from PET/CT on 11/18/2016 and is in keeping with the patient's history of cervical cancer. 2. Diffuse mesenteric edema and small pericardial effusion. HOSPICE DIAGNOSES   Active Symptoms:  1. Generalised pain  2. Fatigue/weakness/lethargy  3. Encephalopathy/delirium  4. Anxiety/restlessness     PLAN   1. Admit to Respite care due to caregiver exhaustion. 2. Continue home comfort meds  3. Change meds to liquid form: methadone 5mg liquid q8h  4. Oxycodone 10mg po q1h prn severe pain  5. Ativan 2mg q1h prn anxiety  6. O2 for comfort    7.  and SW to support family needs  8. Disposition: home with hospice once respite is completed    Prognosis estimated based on 04/29/17 clinical assessment is:   [] Few to Many Hours  [x] Hours to Days   [] Few to Many Days   [] Days to Weeks   [] Few to Many Weeks   [] Weeks to Months   [] Few to Many Months    Communicated plan of care with: Hospice Case Manager; Hospice IDT; Care Team     GOALS OF CARE     Resuscitation Status: DNR  Durable DNR: [x] Yes [] No    Advance Care Planning 3/1/2017   Patient's Healthcare Decision Maker is: Legal Next of Saad Paredes   Primary Decision Maker Name Denise Choudhury   Primary Decision Maker Phone Number [phone]   Primary Decision Maker Relationship to Patient Parent   Confirm Advance Directive Yes, not on file        HISTORY     History obtained from: chart, staff    CHIEF COMPLAINT: N/A  The patient is:   [] Verbal  [x] Nonverbal  [x] Unresponsive    HPI/SUBJECTIVE:  Pt brought in lethargic and barely responsive but was in pain, anxious and restless.    Pt seems to be in active dying phase; mottling feet/toes +++       REVIEW OF SYSTEMS       The following systems were: [] reviewed  [x] unable to be reviewed      Adult Non-Verbal Pain Assessment Score: 6    Face  [x] 0   No particular expression or smile  [] 1   Occasional grimace, tearing, frowning, wrinkled forehead  [] 2   Frequent grimace, tearing, frowning, wrinkled forehead    Activity (movement)  [] 0   Lying quietly, normal position  [x] 1   Seeking attention through movement or slow, cautious movement  [] 2   Restless, excessive activity and/or withdrawal reflexes    Guarding  [] 0   Lying quietly, no positioning of hands over areas of body  [] 1   Splinting areas of the body, tense  [x] 2   Rigid, stiff    Physiology (vital signs)  [] 0   Stable vital signs  [] 1   Change in any of the following: SBP > 20mm Hg; HR > 20/minute  [x] 2   Change in any of the following: SBP > 30mm Hg; HR > 25/minute    Respiratory  [] 0   Baseline RR/SpO2, compliant with ventilator  [x] 1   RR > 10 above baseline, or 5% drop SpO2, mild asynchrony with ventilator  [] 2   RR > 20 above baseline, or 10% drop SpO2, asynchrony with ventilator     FUNCTIONAL ASSESSMENT     Palliative Performance Scale (PPS):10%     PSYCHOSOCIAL/SPIRITUAL ASSESSMENT     Active Problems:    * No active hospital problems.  *    Past Medical History:   Diagnosis Date    ADD (attention deficit disorder)     Cancer (HCC)     CERVICAL    Chronic pain     KNEES    GERD (gastroesophageal reflux disease)     History of panic attacks     Hypertension     Ill-defined condition 2014    HX HIVES CONJUCTIVA EYES PER PATIENT    Morbid obesity ([address]     SOB (shortness of breath)     SOB WITH EXERTION      Past Surgical History:   Procedure Laterality Date    HX GYN      CK[facility],     HX HEENT      WISDOM TEETH    HX VASCULAR ACCESS      PICC line RT AC      Social History   Substance Use Topics    Smoking status: Former Smoker     Quit date: 11/3/2014    Smokeless tobacco: Never Used    Alcohol use No     Family History   Problem Relation Age of Onset    Anxiety Mother     Attention Deficit Disorder Mother     Depression Mother     Cancer Father      LUNG    Cancer Maternal Grandmother      COLON    Anesth Problems Neg Hx       Allergies   Allergen Reactions    Coumadin [Warfarin] Other (comments)     SEVERE ABDOMINAL CRAMPING      Current Facility-Administered Medications   Medication Dose Route Frequency    oxyCODONE (ROXICODONE INTENSOL) 20 mg/mL concentrated solution 10 mg  10 mg Oral Q1H PRN    LORazepam (INTENSOL) 2 mg/mL oral concentrate 2 mg  2 mg Oral Q1H PRN    methadone (DOLOPHINE) 10 mg/mL concentrated solution 5 mg  5 mg Oral Q8H     Current Outpatient Prescriptions   Medication Sig    morphine (ROXANOL) 100 mg/5 mL (20 mg/mL) concentrated solution Take 10 mg by mouth every one (1) hour as needed (DYSPNEA OR SEVERE PAIN). Pain/Shortness of Breath:  Morphine liquid 20mg/ml, 15ml bottle (#1)  Sig:  Give 10 mg (0.5mL) PO/SL every 1 hour  as needed for pain. Shortness of breath. If no relief, call [facility]. Side Effects:  Drowsiness, dizziness, nausea, vomiting, constipation, sweating    morphine (ROXANOL) 100 mg/5 mL (20 mg/mL) concentrated solution Take 10 mg by mouth every eight (8) hours.  LORazepam (INTENSOL) 2 mg/mL concentrated solution Take 2 mg by mouth every two (2) hours as needed for Agitation or Anxiety. Anxiety:  Lorazepam (Ativan)  2mg/ml, 30 ml bottle (#1) Sig:  Give 1ml (2mg) PO/SL every 2 hrs as needed anxiety. If still no relief, call [facility]. Side Effects:  Drowsiness, dizziness, loss of coordination, headache, nausea, blurred vision    Indications: anxiety    OXYGEN-AIR DELIVERY SYSTEMS 2 L/min by Nasal route as needed (SOB). Indications: SOB    venlafaxine-SR (EFFEXOR XR) 75 mg capsule Take 75 mg by mouth two (2) times a day.  oxyCODONE IR (ROXICODONE) 10 mg tab immediate release tablet Take 10 mg by mouth every three (3) hours as needed (MODERATE PAIN ).  prochlorperazine (COMPAZINE) 10 mg tablet Take 10 mg by mouth every six (6) hours as needed (NAUSEA).  peppermint oil 5 mL by Other route as needed (AROMATHERAPY FOR NAUSEA OR DYSPNEA). TO BE APPLIED TO PAPER TOWEL AND PLACED IN ZIP LOCK BAG  PATIENT TO OPEN FOR INHALATION AS NEEDED  NOT TO PUT OIL ON SKIN    haloperidol (HALDOL) 2 mg/mL oral concentrate Take 0.5 mg by mouth every four (4) hours as needed (AGITATION OR NAUSEA).  Agitation:  Haloperidol (Haldol)  2mg/ml, 15 ml bottle (#1) Sig:  Give 0.25ml (0.5mg) PO/SL every 4 hour x 2 doses as needed agitation. If still no relief, call [facility]. Side Effects:  Drowsiness, dizziness, dry mouth, difficulty urinating, headache    hyoscyamine SL (LEVSIN/SL) 0.125 mg SL tablet by SubLINGual route every four (4) hours as needed (SECRETIONS). Secretions:   Hyoscyamine  (Levsin®0.125mg, (#4 tabs) Sig: Give 1 tablet SL every 4 hours X 2 doses as needed. If no relief, consult PROVIDER  Side effects : Dizziness, drowsiness, blurred vision, dry mouth, headache, constipation, flushing    acetaminophen (TYLENOL) 650 mg suppository Insert 650 mg into rectum every four (4) hours as needed for Fever. Fever:  Acetaminophen (Tylenol)  650mg supp(#6)  Sig:  Insert one supp per rectum every 4 hours as needed for fever; if no relief in 24 hours, call [facility]. Side Effects:  Rectal irritation/burning/itching    bisacodyl (DULCOLAX) 10 mg suppository Insert 10 mg into rectum daily as needed. Sig:  Insert one supp per rectum daily as needed for constipation; if no relief in 24 hours, call [facility]. Side Effects:  Rectal irritation/burning/itching, mild abdominal cramps, nausea    levoFLOXacin (LEVAQUIN) 500 mg tablet Take 1 Tab by mouth daily. Indications: BACTERIAL URINARY TRACT INFECTION (Patient not taking: Reported on 4/1/2017)    megestrol (MEGACE) 20 mg tablet Take 2 Tabs by mouth two (2) times a day. (Patient taking differently: Take 2 Tabs by mouth two (2) times daily as needed (nausea or decreased appetite). Indications: nausea or decreased appetite)    oxyCODONE-acetaminophen (PERCOCET 10)  mg per tablet Take 1 Tab by mouth every four (4) hours as needed for Pain. Max Daily Amount: 6 Tabs. (Patient not taking: Reported on 4/1/2017)    cyclobenzaprine (FLEXERIL) 5 mg tablet Take 1 Tab by mouth nightly.  ondansetron hcl (ZOFRAN) 4 mg tablet Take 1 Tab by mouth every eight (8) hours as needed for Nausea. PHYSICAL EXAM     Wt Readings from Last 3 Encounters:   04/01/17 99.8 kg (220 lb)   03/10/17 117.5 kg (259 lb)   02/26/17 120.9 kg (266 lb 8.6 oz)       Visit Vitals    Pulse (!) 50    Temp 97.4 °F (36.3 °C)    Resp (!) 42    SpO2 96%       Supplemental O2  [] Yes  [x] NO  Last bowel movement:     Currently this patient has:  [] Peripheral IV [] PICC  [] PORT [] ICD    [] Goldsmith Catheter [] NG Tube   [] PEG Tube    [] Rectal Tube [x] Drain: right sided nephrostomy tube  [] Other:     Constitutional: lethargic, unresponsive, pale, thin, appears in distress, restless and anxious  Eyes: pallor+  ENMT:moist oral mucosa  Cardiovascular: distant heart sounds  Respiratory:  labored breathing, diminished air entry  Gastrointestinal: soft, sunken, BS +, non tender, right nephrostomy tube +  Musculoskeletal:thin, no edema  Skin: warm, dry,  mottling +++  Neurologic: lethargic, unable to assess  Psychiatric: anxious affect  Other:       Pertinent Lab and or Imaging Tests:           Total time: 70mts  Counseling / coordination time: 20mts discussing with staff  > 50% counseling / coordination?:

## 2017-04-29 NOTE — PROGRESS NOTES
Pt  at  peacefully. Mother notified. Mother of pt instructed us to throw away any of the clothign items that pt had brought to the [facility]. [employer] notified and pt was picked up at 0100.

## 2017-04-30 PROCEDURE — 0655 HSPC INPATIENT RESPITE

## 2017-05-01 VITALS — RESPIRATION RATE: 12 BRPM

## 2017-05-02 PROCEDURE — A4216 STERILE WATER/SALINE, 10 ML: HCPCS

## 2017-05-03 NOTE — DISCHARGE SUMMARY
Discharge Summary    [facility]  [facility]  [phone]      Date of Admission: 4/28/2017  Date of Discharge: 4/28/2017    Elmer Rene is a 35y.o. year old who was admitted to [facility] at [facility] with a Hospice diagnosis of Endocervical Cancer. The patient's care was focused on comfort and the patient passed away on 4/28/2017.

## 2019-03-13 NOTE — H&P
Radiology History and Physical    Patient: Anastasiya Lopez 35 y.o. female       Chief Complaint: Post OP Complication      History of Present Illness: right neph tube leakage    History:    Past Medical History:   Diagnosis Date    ADD (attention deficit disorder)     Cancer ([address]     CERVICAL    Chronic pain     KNEES    GERD (gastroesophageal reflux disease)     History of panic attacks     Hypertension     Ill-defined condition 2014    HX HIVES CONJUCTIVA EYES PER PATIENT    Morbid obesity ([address]     SOB (shortness of breath)     SOB WITH EXERTION     Family History   Problem Relation Age of Onset    Anxiety Mother     Attention Deficit Disorder Mother     Depression Mother     Cancer Father      LUNG    Cancer Maternal Grandmother      COLON    Anesth Problems Neg Hx      Social History     Social History    Marital status: SINGLE     Spouse name: N/A    Number of children: N/A    Years of education: N/A     Occupational History    Not on file. Social History Main Topics    Smoking status: Former Smoker     Quit date: 11/3/2014    Smokeless tobacco: Never Used    Alcohol use No    Drug use: No    Sexual activity: Not on file     Other Topics Concern    Not on file     Social History Narrative       Allergies: Allergies   Allergen Reactions    Coumadin [Warfarin] Other (comments)     SEVERE ABDOMINAL CRAMPING       Current Medications:  No current facility-administered medications for this encounter. Current Outpatient Prescriptions   Medication Sig    oxyCODONE-acetaminophen (PERCOCET 10)  mg per tablet Take 1 Tab by mouth every four (4) hours as needed for Pain. Max Daily Amount: 6 Tabs.  metoclopramide HCl (REGLAN) 10 mg tablet Take 1 Tab by mouth Before breakfast, lunch, and dinner for 10 days.     FLECTOR 1.3 % pt12 DARREN 1 PATCH Q 12 H FOR BACK PAIN    fentaNYL (DURAGESIC) 12 mcg/hr patch APPLY ONE PATCH TO SKIN EVERY 72 HOURS FOR PAIN    morphine IR (MS IR) 15 mg tablet TK 1 TO 2 TS PO Q 4 H PRN P    cyclobenzaprine (FLEXERIL) 5 mg tablet Take 1 Tab by mouth nightly.  diphenoxylate-atropine (LOMOTIL) 2.5-0.025 mg per tablet Take 1 Tab by mouth four (4) times daily as needed for Diarrhea. Max Daily Amount: 4 Tabs.  ondansetron hcl (ZOFRAN) 4 mg tablet Take 1 Tab by mouth every eight (8) hours as needed for Nausea.  guaiFENesin-dextromethorphan (MUCINEX DM) 600-30 mg per tablet Take 1 Tab by mouth two (2) times a day.  atenolol (TENORMIN) 25 mg tablet TK 1 T PO QD    dextroamphetamine-amphetamine (ADDERALL) 20 mg tablet TK 1 T PO BID    LORazepam (ATIVAN) 2 mg tablet TK 1 T PO BID PRN    venlafaxine (EFFEXOR) 75 mg tablet TK 1 T PO BID FOR DEPRESSION        Physical Exam:  Blood pressure 96/68, pulse 83, temperature 98.5 °F (36.9 °C), resp. rate 15, height 5' 6\" (1.676 m), weight 117.5 kg (259 lb), last menstrual period 11/04/2016, SpO2 98 %. LUNG: clear to auscultation bilaterally, HEART: regular rate and rhythm, S1, S2 normal, no murmur, click, rub or gallop      Alerts:    Hospital Problems  Date Reviewed: 2/7/2017    None          Laboratory:      Recent Labs      03/10/17   1622   HGB  10.4*   HCT  33.4*   WBC  5.2   PLT  346   BUN  35*   CREA  2.33*   K  3.8         Plan of Care/Planned Procedure:  Risks, benefits, and alternatives reviewed with patient and she agrees to proceed with the procedure.      Plan is for right neph tube change      Chevy Vaughn MD DISCHARGE

## (undated) DEVICE — SUTURE PROL SZ 0 L30IN NONABSORBABLE BLU L40MM CT 1/2 CIR 8434H

## (undated) DEVICE — PAD SANIT NPKN 4IN GRD

## (undated) DEVICE — DEVON™ KNEE AND BODY STRAP 60" X 3" (1.5 M X 7.6 CM): Brand: DEVON

## (undated) DEVICE — SURGICAL PROCEDURE PACK BASIN MAJ SET CUST NO CAUT

## (undated) DEVICE — TRAY PREP DRY W/ PREM GLV 2 APPL 6 SPNG 2 UNDPD 1 OVERWRAP

## (undated) DEVICE — GOWN,SIRUS,NONRNF,SETINSLV,2XL,18/CS: Brand: MEDLINE

## (undated) DEVICE — Z INACTIVE NO USAGE TURNOVER KIT RM CLEANOP

## (undated) DEVICE — PERI/GYN PACK: Brand: CONVERTORS

## (undated) DEVICE — SUTURE PROL SZ 0 L30IN NONABSORBABLE BLU L26MM CT-2 1/2 CIR 8412H